# Patient Record
Sex: FEMALE
[De-identification: names, ages, dates, MRNs, and addresses within clinical notes are randomized per-mention and may not be internally consistent; named-entity substitution may affect disease eponyms.]

---

## 2020-02-24 ENCOUNTER — HOSPITAL ENCOUNTER (OUTPATIENT)
Dept: HOSPITAL 56 - MW.SDS | Age: 30
Setting detail: OBSERVATION
LOS: 1 days | Discharge: HOME | End: 2020-02-25
Attending: OBSTETRICS & GYNECOLOGY | Admitting: OBSTETRICS & GYNECOLOGY
Payer: COMMERCIAL

## 2020-02-24 DIAGNOSIS — O34.32: Primary | ICD-10-CM

## 2020-02-24 DIAGNOSIS — Z3A.22: ICD-10-CM

## 2020-02-24 DIAGNOSIS — E66.9: ICD-10-CM

## 2020-02-24 DIAGNOSIS — Z79.899: ICD-10-CM

## 2020-02-24 DIAGNOSIS — O99.212: ICD-10-CM

## 2020-02-24 PROCEDURE — 59320 REVISION OF CERVIX: CPT

## 2020-02-24 PROCEDURE — 85027 COMPLETE CBC AUTOMATED: CPT

## 2020-02-24 PROCEDURE — 36415 COLL VENOUS BLD VENIPUNCTURE: CPT

## 2020-02-24 PROCEDURE — 86900 BLOOD TYPING SEROLOGIC ABO: CPT

## 2020-02-24 PROCEDURE — 86592 SYPHILIS TEST NON-TREP QUAL: CPT

## 2020-02-24 PROCEDURE — 86850 RBC ANTIBODY SCREEN: CPT

## 2020-02-24 PROCEDURE — 86901 BLOOD TYPING SEROLOGIC RH(D): CPT

## 2020-02-24 NOTE — OR
SURGEON:

Fran Clemons MD

 

DATE OF PROCEDURE:

 

PREOPERATIVE DIAGNOSIS:

Intrauterine pregnancy at 22-23 weeks with cervical incompetence.

 

POSTOPERATIVE DIAGNOSIS:

Intrauterine pregnancy at 22-23 weeks with cervical incompetence.

 

OPERATION PERFORMED:

Emergency Gomez cerclage.

 

PRIMARY SURGEON:

Fran Clemons MD.

 

ASSISTANT:

OR tech.

 

ANESTHESIA:

Spinal, Mich Shannon.

 

ESTIMATED BLOOD LOSS:

Minimum.

 

COMPLICATION:

None.

 

INDICATIONS FOR SURGERY:

This patient is primigravida who has been followed in our office.  She came here

today for her 22 weeks' visit, which was unremarkable.  She had really no risk

factor other than the fact that she had multiple large fibroids in her uterus

prior to her pregnancy.  The patient went today for her anatomy ultrasound, and

she was found to have shortening of the cervix.  Her cervical length was less

than 1 cm and there was channeling of fluid into the cervical canal.  I reviewed

the ultrasound and concurred with the fact that the patient does have cervical

incompetence.  I did a speculum exam, and I could see the membrane was through

the cervical opening, so we decided to do an emergency cerclage.  I explained

that to the patient and her , and we proceeded with the cerclage.

 

PROCEDURE IN DETAIL:

The patient was brought to the OR, properly identified.  After adequate level of

spinal anesthesia, the patient was placed in Trendelenburg position, and after

waiting for 10 minutes for the amniotic fluid to recede from the cervical canal,

we proceeded to prep and drape the patient as usual.  Meng catheter was used to

empty the bladder and weighted speculum was placed in vagina.  Using Mersilene

band, Gomez cerclage was performed in a circular fashion.  Sutured around the

cervix and tied with due amount of tension, did not strangle the cervix.  Once

was that accomplished, the procedure ended.  The instrument and sponge count was

correct.  The patient tolerated the procedure well, went to recovery room in

stable general condition.

 

 

LILLIAN / JYOTI

DD:  02/24/2020 17:02:46

DT:  02/24/2020 17:39:15

Job #:  [id number]

## 2020-02-24 NOTE — PCM.PREANE
Preanesthetic Assessment





- Anesthesia/Transfusion/Family Hx


Anesthesia History: No Prior Anesthesia


Family History of Anesthesia Reaction: No


Transfusion History: No Prior Transfusion(s)





- Physical Assessment


NPO Status Date: 02/24/20


NPO Status Time: 08:00


Height: 1.63 m


Weight: 81.647 kg


ASA Class: 1E





- Allergies


Allergies/Adverse Reactions: 


 Allergies











Allergy/AdvReac Type Severity Reaction Status Date / Time


 


No Known Allergies Allergy   Verified 02/24/20 16:01














- Acknowledgements


Anesthesia Type Planned: Spinal


Pt an Appropriate Candidate for the Planned Anesthesia: Yes


Alternatives and Risks of Anesthesia Discussed w Pt/Guardian: Yes


Pt/Guardian Understands and Agrees with Anesthesia Plan: Yes





PreAnesthesia Questionnaire


OB/GYN History: Reports: Pregnancy


Endocrine/Metabolic History: Reports: Obesity/BMI 30+





- Past Surgical History


Head Surgeries/Procedures: Reports: None





- SUBSTANCE USE


Smoking Status *Q: Never Smoker


Recreational Drug Use History: No





- HOME MEDS


Home Medications: 


 Home Meds





Ondansetron [Zofran] 4 mg PO ASDIRECTED PRN 02/24/20 [History]


Promethazine [Phenergan] 25 mg PO ASDIRECTED PRN 02/24/20 [History]

## 2020-02-24 NOTE — PCM.POSTAN
POST ANESTHESIA ASSESSMENT





- MENTAL STATUS


Mental Status: Alert





- VITAL SIGNS


Vital Signs: 


 Last Vital Signs











Temp  36.8 C   02/24/20 16:59


 


Pulse  64   02/24/20 17:09


 


Resp  14   02/24/20 17:09


 


BP  122/73   02/24/20 17:09


 


Pulse Ox  100   02/24/20 17:09














- RESPIRATORY


Respiratory Status: Respiratory Rate WNL





- CARDIOVASCULAR


CV Status: Pulse Rate WNL





- GASTROINTESTINAL


GI Status: No Symptoms





- POST OP HYDRATION


Hydration Status: Adequate & Stable

## 2020-02-25 NOTE — PCM.SURGPN
- General Info


Date of Service: 02/25/20


POD#: 1


Functional Status: Reports: Pain Controlled





- Review of Systems


General: Reports: No Symptoms


HEENT: Reports: No Symptoms


Pulmonary: Reports: No Symptoms


Cardiovascular: Reports: No Symptoms


Gastrointestinal: Reports: No Symptoms


Genitourinary: Reports: No Symptoms


Musculoskeletal: Reports: No Symptoms


Skin: Reports: No Symptoms


Neurological: Reports: No Symptoms


Psychiatric: Reports: No Symptoms





- Patient Data


Vitals - Most Recent: 


 Last Vital Signs











Temp  36.3 C   02/25/20 08:00


 


Pulse  76   02/25/20 08:00


 


Resp  14   02/25/20 08:00


 


BP  123/78   02/25/20 08:00


 


Pulse Ox  98   02/25/20 08:00











Weight - Most Recent: 81.647 kg


I&O - Last 24 Hours: 


 Intake & Output











 02/24/20 02/25/20 02/25/20





 22:59 06:59 14:59


 


Intake Total 700  


 


Output Total  375 


 


Balance 700 -375 











Lab Results Last 24 Hrs: 


 Laboratory Results - last 24 hr











  02/24/20 02/24/20 Range/Units





  20:26 20:26 


 


WBC  9.39   (4.0-11.0)  K/uL


 


RBC  2.95 L   (4.30-5.90)  M/uL


 


Hgb  8.4 L   (12.0-16.0)  g/dL


 


Hct  26.8 L   (36.0-46.0)  %


 


MCV  90.8   (80.0-98.0)  fL


 


MCH  28.5   (27.0-32.0)  pg


 


MCHC  31.3   (31.0-37.0)  g/dL


 


RDW Std Deviation  44.1   (28.0-62.0)  fl


 


RDW Coeff of Bryan  13   (11.0-15.0)  %


 


Plt Count  299   (150-400)  K/uL


 


MPV  10.00   (7.40-12.00)  fL


 


Nucleated RBC %  0.0   /100WBC


 


Nucleated RBCs #  0   K/uL


 


Blood Type   A POSITIVE  


 


Antibody Screen   NEGATIVE  











Med Orders - Current: 


 Current Medications





Butorphanol Tartrate (Stadol)  1 mg IVPUSH Q1H PRN


   PRN Reason: Pain


Carboprost Tromethamine (Hemabate Ds)  250 mcg IM ASDIRECTED PRN


   PRN Reason: Post Partum Hemorrhage


Lactated Ringer's (Ringers, Lactated)  1,000 mls @ 150 mls/hr IV ASDIRECTED MAXIMO


   Last Admin: 02/24/20 16:00 Dose:  150 mls/hr


Tranexamic Acid 1,000 mg/ (Sodium Chloride)  110 mls @ 660 mls/hr IV ONETIME PRN


   PRN Reason: Bleeding


Lidocaine HCl (Xylocaine 1%)  50 ml INJECT ONETIME PRN


   PRN Reason: Laceration repair


Methylergonovine Maleate (Methergine)  0.2 mg IM ASDIRECTED PRN


   PRN Reason: Post Partum Hemorrhage


Misoprostol (Cytotec)  200 mcg PO ONETIME PRN


   PRN Reason: Post Partum Hemorrhage


Nalbuphine HCl (Nubain)  10 mg IVPUSH Q1H PRN


   PRN Reason: Pain (severe 7-10)


Oxycodone/Acetaminophen (Percocet 325-5 Mg)  1 tab PO Q4H PRN


   PRN Reason: Pain (moderate 4-6)


   Last Admin: 02/24/20 22:14 Dose:  1 tab


Oxycodone/Acetaminophen (Percocet 325-5 Mg)  2 tab PO Q4H PRN


   PRN Reason: Pain (severe 7-10)


Sodium Chloride (Saline Flush)  10 ml FLUSH ASDIRECTED PRN


   PRN Reason: Keep Vein Open


Sodium Chloride (Saline Flush)  2.5 ml FLUSH ASDIRECTED PRN


   PRN Reason: Keep Vein Open


Sodium Chloride (Normal Saline)  10 ml IV ASDIRECTED PRN


   PRN Reason: IV Use


Sterile Water (Sterile Water For Irrigation)  1,000 ml IRR ASDIRECTED PRN


   PRN Reason: delivery





Discontinued Medications





Betamethasone Acet/Betameth SodPhos (Celestone Soluspan 6 Mg/Ml)  12 mg IM 

ONETIME ONE


   Stop: 02/25/20 08:06


Cefazolin Sodium (Ancef) Confirm Administered Dose 1 gm .ROUTE .STK-MED ONE


   Stop: 02/24/20 16:12


Sodium Chloride (Normal Saline) Confirm Administered Dose 20 mls @ as directed 

.ROUTE .STK-MED ONE


   Stop: 02/24/20 16:12











- Exam


Wound/Incisions: Healing Well


General: Alert, Oriented


HEENT: Pupils Equal


Neck: Supple


Lungs: Clear to Auscultation, Normal Respiratory Effort


Cardiovascular: Regular Rate, Regular Rhythm


GI/Abdominal Exam: Normal Bowel Sounds, Soft, Non-Tender, No Organomegaly, No 

Distention, No Abnormal Bruit, No Mass, Pelvis Stable


Extremities: Normal Inspection, Normal Range of Motion, Non-Tender, No Pedal 

Edema, Normal Capillary Refill


Skin: Warm, Dry, Intact


Neurological: No New Focal Deficit


Psy/Mental Status: Alert, Normal Affect, Normal Mood





Sepsis Event Note





- Evaluation


Sepsis Screening Result: No Definite Risk





- Focused Exam


Vital Signs: 


 Vital Signs











  Temp Pulse Resp BP BP Pulse Ox


 


 02/25/20 08:00  36.3 C  76  14  123/78   98


 


 02/25/20 04:00  36.3 C  65  18   136/75  100


 


 02/25/20 00:20  36.6 C  75  16   124/62  97











Date Exam was Performed: 02/25/20


Time Exam was Performed: 10:28





- Problem List Review


Problem List Initiated/Reviewed/Updated: Yes





- My Orders


Last 24 Hours: 


 Active Orders 24 hr











 Category Date Time Status


 


 Patient Status [ADT] Routine ADT  02/24/20 16:55 Active


 


 Fetal Heart Tones [RC] ASDIRECTED Care  02/24/20 16:52 Active


 


 May Shower [RC] ASDIRECTED Care  02/24/20 16:55 Active


 


 Notify Provider [RC] PRN Care  02/24/20 16:55 Active


 


 Ready for Discharge [RC] PER UNIT ROUTINE Care  02/25/20 08:21 Active


 


 Up ad Aura [RC] ASDIRECTED Care  02/24/20 16:55 Active


 


 Vital Signs [RC] PER UNIT ROUTINE Care  02/24/20 16:55 Active


 


 Regular Diet [DIET] Diet  02/24/20 Dinner Active


 


 RPR (SYPHILIS SERO) W/ RFLX [REF] Routine Lab  02/24/20 20:26 Received


 


 Acetaminophen/oxyCODONE [Percocet 325-5 MG] Med  02/24/20 21:56 Active





 1 tab PO Q4H PRN   


 


 Acetaminophen/oxyCODONE [Percocet 325-5 MG] Med  02/24/20 21:59 Active





 2 tab PO Q4H PRN   


 


 Butorphanol [Stadol] Med  02/24/20 16:52 Active





 1 mg IVPUSH Q1H PRN   


 


 Carboprost Tromethamine [Hemabate DS] Med  02/24/20 16:52 Active





 250 mcg IM ASDIRECTED PRN   


 


 Lactated Ringers [Ringers, Lactated] 1,000 ml Med  02/24/20 17:00 Active





 IV ASDIRECTED   


 


 Lidocaine 1% [Xylocaine 1%] Med  02/24/20 16:52 Active





 50 ml INJECT ONETIME PRN   


 


 Methylergonovine [Methergine] Med  02/24/20 16:52 Active





 0.2 mg IM ASDIRECTED PRN   


 


 Nalbuphine [Nubain] Med  02/24/20 16:52 Active





 10 mg IVPUSH Q1H PRN   


 


 Sodium Chloride 0.9% [Normal Saline] Med  02/24/20 16:52 Active





 10 ml IV ASDIRECTED PRN   


 


 Sodium Chloride 0.9% [Saline Flush] Med  02/24/20 16:52 Active





 10 ml FLUSH ASDIRECTED PRN   


 


 Sodium Chloride 0.9% [Saline Flush] Med  02/24/20 16:52 Active





 2.5 ml FLUSH ASDIRECTED PRN   


 


 Tranexamic Acid [Cyklokapron] 1,000 mg Med  02/24/20 16:52 Active





 Sodium Chloride 0.9% [Normal Saline] 100 ml   





 IV ONETIME   


 


 Water For Irrigation,Sterile [Sterile Water for Med  02/24/20 16:52 Active





 Irrigation]   





 1,000 ml IRR ASDIRECTED PRN   


 


 miSOPROStoL [Cytotec] Med  02/24/20 16:52 Active





 200 mcg PO ONETIME PRN   


 


 Peripheral IV Insertion Adult [OM.PC] Routine Oth  02/24/20 16:55 Ordered


 


 Resuscitation Status Routine Resus Stat  02/24/20 16:52 Ordered








 Medication Orders





Butorphanol Tartrate (Stadol)  1 mg IVPUSH Q1H PRN


   PRN Reason: Pain


Carboprost Tromethamine (Hemabate Ds)  250 mcg IM ASDIRECTED PRN


   PRN Reason: Post Partum Hemorrhage


Lactated Ringer's (Ringers, Lactated)  1,000 mls @ 150 mls/hr IV ASDIRECTED MAXIMO


   Last Admin: 02/24/20 16:00  Dose: 150 mls/hr


Tranexamic Acid 1,000 mg/ (Sodium Chloride)  110 mls @ 660 mls/hr IV ONETIME PRN


   PRN Reason: Bleeding


Lidocaine HCl (Xylocaine 1%)  50 ml INJECT ONETIME PRN


   PRN Reason: Laceration repair


Methylergonovine Maleate (Methergine)  0.2 mg IM ASDIRECTED PRN


   PRN Reason: Post Partum Hemorrhage


Misoprostol (Cytotec)  200 mcg PO ONETIME PRN


   PRN Reason: Post Partum Hemorrhage


Nalbuphine HCl (Nubain)  10 mg IVPUSH Q1H PRN


   PRN Reason: Pain (severe 7-10)


Oxycodone/Acetaminophen (Percocet 325-5 Mg)  1 tab PO Q4H PRN


   PRN Reason: Pain (moderate 4-6)


   Last Admin: 02/24/20 22:14  Dose: 1 tab


Oxycodone/Acetaminophen (Percocet 325-5 Mg)  2 tab PO Q4H PRN


   PRN Reason: Pain (severe 7-10)


Sodium Chloride (Saline Flush)  10 ml FLUSH ASDIRECTED PRN


   PRN Reason: Keep Vein Open


Sodium Chloride (Saline Flush)  2.5 ml FLUSH ASDIRECTED PRN


   PRN Reason: Keep Vein Open


Sodium Chloride (Normal Saline)  10 ml IV ASDIRECTED PRN


   PRN Reason: IV Use


Sterile Water (Sterile Water For Irrigation)  1,000 ml IRR ASDIRECTED PRN


   PRN Reason: delivery











- Assessment


Assessment           (Free Text/Narrative):: 





Sports emergency Gomez's cerclage the patient have no bleeding and no 

contraction and no evidence of rupture membrane. We will give her her first 

dose of the steroid today and we'll send her home to be followed in the office 

next Friday





- Plan


Plan                        (Free Text/Narrative):: 





Bedrest to be followed in the office next Friday

## 2020-02-25 NOTE — [id number]
Post Anesthesia Note





- EVALUATION WITHIN 48HRS OF ANESTHETIC


Vital Signs in Normal Range: Yes


Patient Participated in Evaluation: Yes


Respiratory Function Stable: Yes


Airway Patent: Yes


Cardiovascular Function Stable: Yes


Hydration Status Stable: Yes


Pain Control Satisfactory: Yes


Nausea and Vomiting Control Satisfactory: Yes


Mental Status Recovered: Yes


Vital Signs: 


 Last Vital Signs











Temp  36.3 C   02/25/20 04:00


 


Pulse  65   02/25/20 04:00


 


Resp  18   02/25/20 04:00


 


BP  136/75   02/25/20 04:00


 


Pulse Ox  100   02/25/20 04:00














- COMMENTS/OBSERVATIONS


Free Text/Narrative:: 





Dressed and ready to go home.

## 2020-02-25 NOTE — PCM.OPNOTE
- General Post-Op/Procedure Note


Date of Surgery/Procedure: 02/24/20


Operative Procedure(s): Gomez Cerclage


Pre Op Diagnosis: [id number], Cervical incompetance.


Post-Op Diagnosis: Same


Anesthesia Technique: Spinal


Primary Surgeon: Fran Clemons


Complications: None


Condition: Good


Free Text/Narrative:: 


 Intake & Output











 02/24/20 02/25/20 02/25/20





 22:59 06:59 14:59


 


Intake Total 700  


 


Output Total  375 


 


Balance 700 -375

## 2020-02-25 NOTE — PCM.DCSUM1
**Discharge Summary





- Hospital Course


Diagnosis: Stroke: No





- Discharge Data


Discharge Date: 02/25/20


Discharge Disposition: Home, Self-Care 01


Condition: Good





- Referral to Home Health


Primary Care Physician: 


Fran Clemons MD








- Patient Summary/Data


Operative Procedure(s) Performed: Gomez Cerclage





- Patient Instructions


Diet: Usual Diet as Tolerated


Activity: As Tolerated


Driving: Do Not Drive


Showering/Bathing: May Shower





- Discharge Plan


Home Medications: 


 Home Meds





Ondansetron [Zofran] 4 mg PO ASDIRECTED PRN 02/24/20 [History]


Promethazine [Phenergan] 25 mg PO ASDIRECTED PRN 02/24/20 [History]








Patient Handouts:  Cervical Cerclage, Betamethasone injection, Cervical Cerclage

, Care After


Referrals: 


[facility] [Outside]


rFan Clemons MD [Primary Care Provider] - 02/28/20 1:15 pm





- Discharge Summary/Plan Comment


DC Time >30 min.: Yes





- General Info


Date of Service: 02/25/20


Functional Status: Reports: Pain Controlled





- Review of Systems


General: Reports: No Symptoms


HEENT: Reports: No Symptoms


Pulmonary: Reports: No Symptoms


Cardiovascular: Reports: No Symptoms


Gastrointestinal: Reports: No Symptoms


Genitourinary: Reports: No Symptoms


Musculoskeletal: Reports: No Symptoms


Skin: Reports: No Symptoms


Neurological: Reports: No Symptoms


Psychiatric: Reports: No Symptoms





- Patient Data


Vitals - Most Recent: 


 Last Vital Signs











Temp  36.3 C   02/25/20 08:00


 


Pulse  76   02/25/20 08:00


 


Resp  14   02/25/20 08:00


 


BP  123/78   02/25/20 08:00


 


Pulse Ox  98   02/25/20 08:00











Weight - Most Recent: 81.647 kg


I&O - Last 24 hours: 


 Intake & Output











 02/24/20 02/25/20 02/25/20





 22:59 06:59 14:59


 


Intake Total 700  


 


Output Total  375 


 


Balance 700 -375 











Lab Results - Last 24 hrs: 


 Laboratory Results - last 24 hr











  02/24/20 02/24/20 Range/Units





  20:26 20:26 


 


WBC  9.39   (4.0-11.0)  K/uL


 


RBC  2.95 L   (4.30-5.90)  M/uL


 


Hgb  8.4 L   (12.0-16.0)  g/dL


 


Hct  26.8 L   (36.0-46.0)  %


 


MCV  90.8   (80.0-98.0)  fL


 


MCH  28.5   (27.0-32.0)  pg


 


MCHC  31.3   (31.0-37.0)  g/dL


 


RDW Std Deviation  44.1   (28.0-62.0)  fl


 


RDW Coeff of Bryan  13   (11.0-15.0)  %


 


Plt Count  299   (150-400)  K/uL


 


MPV  10.00   (7.40-12.00)  fL


 


Nucleated RBC %  0.0   /100WBC


 


Nucleated RBCs #  0   K/uL


 


Blood Type   A POSITIVE  


 


Antibody Screen   NEGATIVE  











Med Orders - Current: 


 Current Medications





Butorphanol Tartrate (Stadol)  1 mg IVPUSH Q1H PRN


   PRN Reason: Pain


Carboprost Tromethamine (Hemabate Ds)  250 mcg IM ASDIRECTED PRN


   PRN Reason: Post Partum Hemorrhage


Lactated Ringer's (Ringers, Lactated)  1,000 mls @ 150 mls/hr IV ASDIRECTED MAXIMO


   Last Admin: 02/24/20 16:00 Dose:  150 mls/hr


Tranexamic Acid 1,000 mg/ (Sodium Chloride)  110 mls @ 660 mls/hr IV ONETIME PRN


   PRN Reason: Bleeding


Lidocaine HCl (Xylocaine 1%)  50 ml INJECT ONETIME PRN


   PRN Reason: Laceration repair


Methylergonovine Maleate (Methergine)  0.2 mg IM ASDIRECTED PRN


   PRN Reason: Post Partum Hemorrhage


Misoprostol (Cytotec)  200 mcg PO ONETIME PRN


   PRN Reason: Post Partum Hemorrhage


Nalbuphine HCl (Nubain)  10 mg IVPUSH Q1H PRN


   PRN Reason: Pain (severe 7-10)


Oxycodone/Acetaminophen (Percocet 325-5 Mg)  1 tab PO Q4H PRN


   PRN Reason: Pain (moderate 4-6)


   Last Admin: 02/24/20 22:14 Dose:  1 tab


Oxycodone/Acetaminophen (Percocet 325-5 Mg)  2 tab PO Q4H PRN


   PRN Reason: Pain (severe 7-10)


Sodium Chloride (Saline Flush)  10 ml FLUSH ASDIRECTED PRN


   PRN Reason: Keep Vein Open


Sodium Chloride (Saline Flush)  2.5 ml FLUSH ASDIRECTED PRN


   PRN Reason: Keep Vein Open


Sodium Chloride (Normal Saline)  10 ml IV ASDIRECTED PRN


   PRN Reason: IV Use


Sterile Water (Sterile Water For Irrigation)  1,000 ml IRR ASDIRECTED PRN


   PRN Reason: delivery





Discontinued Medications





Betamethasone Acet/Betameth SodPhos (Celestone Soluspan 6 Mg/Ml)  12 mg IM 

ONETIME ONE


   Stop: 02/25/20 08:06


Cefazolin Sodium (Ancef) Confirm Administered Dose 1 gm .ROUTE .STK-MED ONE


   Stop: 02/24/20 16:12


Sodium Chloride (Normal Saline) Confirm Administered Dose 20 mls @ as directed 

.ROUTE .STK-MED ONE


   Stop: 02/24/20 16:12











- Exam


General: Reports: Alert, Oriented


HEENT: Reports: Pupils Equal, Pupils Reactive, EOMI, Mucous Membr. Moist/Pink


Neck: Reports: Supple


Lungs: Reports: Clear to Auscultation, Normal Respiratory Effort


Cardiovascular: Reports: Regular Rate, Regular Rhythm


GI/Abdominal Exam: Normal Bowel Sounds, Soft, Non-Tender, No Organomegaly, No 

Distention, No Abnormal Bruit, No Mass, Pelvis Stable


 (Female) Exam: Normal External Exam, Normal Speculum Exam, Normal Bimanual 

Exam


Rectal (Female) Exam: Normal Exam, Normal Rectal Tone


Back Exam: Reports: Normal Inspection, Full Range of Motion


Extremities: Normal Inspection, Normal Range of Motion, Non-Tender, No Pedal 

Edema, Normal Capillary Refill


Skin: Reports: Warm, Dry, Intact


Wound/Incisions: Reports: Healing Well


Neurological: Reports: No New Focal Deficit


Psy/Mental Status: Reports: Alert, Normal Affect, Normal Mood

## 2020-04-01 NOTE — US
Limited obstetrical ultrasound: Multiple real-time images were 

obtained transvaginally of the cervix.

 

Comparison: Previous obstetrical ultrasound of 02/24/20.

 

Fetal presentation: Breech

Heart rate: 168 BPM

 

Cervix: Incompetent cervix with widening of the internal cervical os 

with slight widening of the external cervical os.

 

Impression:

1.  Incompetent cervix which is worsening from previous study.  

Current study shows slight widening of the external cervical os.

 

Diagnostic code #5

 

Study was dictated in MDT

## 2020-04-01 NOTE — PCM.POSTAN
POST ANESTHESIA ASSESSMENT





- MENTAL STATUS


Mental Status: Alert, Oriented





- VITAL SIGNS


Vital Signs: 


 Last Vital Signs











Temp  36 C L  04/01/20 14:38


 


Pulse  124 H  04/01/20 15:30


 


Resp  22 H  04/01/20 15:30


 


BP  113/65   04/01/20 15:30


 


Pulse Ox  100   04/01/20 15:30














- RESPIRATORY


Respiratory Status: Respiratory Rate WNL, Airway Patent, O2 Saturation Stable





- CARDIOVASCULAR


CV Status: Pulse Rate WNL, Blood Pressure Stable





- GASTROINTESTINAL


GI Status: No Symptoms





- PAIN


Pain Score: 0





- POST OP HYDRATION


Hydration Status: Adequate & Stable





- OBSERVATIONS


Free Text/Narrative:: 





No anesthesia problems

## 2020-04-01 NOTE — OR
SURGEON:

Fran Clemons MD

 

DATE OF PROCEDURE:

 

PREOPERATIVE DIAGNOSES:

Intrauterine pregnancy at 27 plus 4 weeks in active labor.  Breech presentation

with the protruding amniotic fluid bag.

 

POSTOPERATIVE DIAGNOSES:

Intrauterine pregnancy at 27 plus 4 weeks in active labor.  Breech presentation

with the protruding amniotic fluid bag.

 

OPERATION PERFORMED:

Primary low transverse  section with removal of Gomez cerclage.

 

PRIMARY SURGEON:

Fran Clemons MD

 

ASSISTANT:

Maribel Hui, certified nurse midwife.

 

ANESTHESIA:

Spinal, Kylah Juarez and Dr. Olivares

 

ESTIMATED BLOOD LOSS:

650 mL.

 

COMPLICATIONS:

None.

 

INDICATIONS FOR SURGERY:

This patient is 29.  She is nulliparous.  Originally, her problem started at 23

weeks when she had funneling of her cervix with a protruding amniotic fluid.  At

that time, I did a oGmez cerclage and placed the patient on strict bedrest,

and we started her on progesterone weekly.  We were able to hold her until 27

plus 4 now.  Today, she presented into Labor and Delivery in active labor with

lower abdominal pain and cramping and regular contractions.  Speculum

examination showed that the cervix was almost 80% to 90% effaced and it was

dilated 3 cm and amniotic fluid membrane was seen and bulging through the os.

She is breech presentation and the cervix was holding by the Gomez cerclage.

Because of the patient's unstable lie and unstable presentation, the decision

was made to do a primary low transverse  section.  We will contact the

prenatal team and have them come to [place] to resuscitate the baby, and we

will do her  section as soon as the team has arrived.

 

PROCEDURE IN DETAIL:

The patient was brought to the OR, properly identified, and after adequate level

of spinal anesthesia, the patient was prepped and draped in sterile fashion as

usual.  A low transverse Pfannenstiel skin incision done.  Trenton fascia and

rectus fascia were opened in direction of the incision.  The 2 recti muscles

were , peritoneal cavity was entered, and low transverse uterine

incision was done.  The fetus was in a breech presentation, delivered without

any problem, handed to the resuscitating team.  Apgar score and weight are not

available at this time.  The placenta delivered spontaneous, complete, and

intact and sent for histopathology.  Repair of the lower uterine segment was

done with 2-0 Vicryl continuous interlocking in 2 layers.  Reperitonealization

done with 3-0 Vicryl continuous and then the peritoneal cavity evacuated

completely from all blood and blood clot.  Then, the peritoneal cavity closed

with 3-0 Vicryl continuous.  The rectus fascia closed with #1 PDS continuous.

Trenton fascia with 3-0 Vicryl continuous.  The skin closed with 3-0 on a Julius

needle in a subcuticular fashion and Dermabond.  After closing the skin, the

patient was placed in lithotomy position and speculum was placed in the vagina

and the cerclage was identified and cut and removed without any problem.

Instrument and sponge count was correct.  The patient tolerated the procedure

well, went to recovery room in stable general condition.

 

 

LILLIAN / JYOTI

DD:  2020 15:17:56

DT:  2020 16:39:16

Job #:  [id number]

[email or web address]

## 2020-04-01 NOTE — PCM.OPNOTE
- General Post-Op/Procedure Note


Date of Surgery/Procedure: 04/01/20


Operative Procedure(s): Primary C/section. removal of [place] Cereclage.


Pre Op Diagnosis: IUP 27+4 wks breech presentation in labor


Post-Op Diagnosis: Same


Anesthesia Technique: Spinal


Primary Surgeon: Fran Clemons


Assistant: Maribel Hui


EBL in mLs: 650


Complications: None


Condition: Good

## 2020-04-01 NOTE — PCM.PREANE
Preanesthetic Assessment





- Anesthesia/Transfusion/Family Hx


Anesthesia History: Prior Anesthesia Without Reaction


Family History of Anesthesia Reaction: No


Transfusion History: No Prior Transfusion(s)


Intubation History: Unknown





- Review of Systems


General: No Symptoms


Pulmonary: No Symptoms


Cardiovascular: No Symptoms


Gastrointestinal: No Symptoms


Neurological: No Symptoms


Other: Reports: None





- Physical Assessment


NPO Status Date: 04/01/20


NPO Status Time: 11:00


Vital Signs: 





110/78 78 96% 


Height: 5 ft 4 in


Weight: 73.028 kg


ASA Class: 2E


Mental Status: Alert & Oriented x3


Dentition: Reports: Normal Dentition


ROM/Head Extension: Full


Lungs: Clear to Auscultation, Normal Respiratory Effort


Cardiovascular: Regular Rate, Regular Rhythm





- Lab


Values: 





 Laboratory Last Values











WBC  16.94 K/uL (4.0-11.0)  H  04/01/20  09:50    


 


RBC  3.74 M/uL (4.30-5.90)  L  04/01/20  09:50    


 


Hgb  11.0 g/dL (12.0-16.0)  L  04/01/20  09:50    


 


Hct  34.8 % (36.0-46.0)  L  04/01/20  09:50    


 


MCV  93.0 fL (80.0-98.0)   04/01/20  09:50    


 


MCH  29.4 pg (27.0-32.0)   04/01/20  09:50    


 


MCHC  31.6 g/dL (31.0-37.0)   04/01/20  09:50    


 


RDW Std Deviation  55.5 fl (28.0-62.0)   04/01/20  09:50    


 


RDW Coeff of Bryan  16 % (11.0-15.0)  H  04/01/20  09:50    


 


Plt Count  264 K/uL (150-400)   04/01/20  09:50    


 


MPV  10.40 fL (7.40-12.00)   04/01/20  09:50    


 


Nucleated RBC %  0.0 /100WBC  04/01/20  09:50    


 


Nucleated RBCs #  0 K/uL  04/01/20  09:50    


 


Urine Color  YELLOW   04/01/20  04:50    


 


Urine Appearance  CLEAR   04/01/20  04:50    


 


Urine pH  6.0  (5.0-8.0)   04/01/20  04:50    


 


Ur Specific Gravity  >= 1.030  (1.001-1.035)   04/01/20  04:50    


 


Urine Protein  NEGATIVE mg/dL (NEGATIVE)   04/01/20  04:50    


 


Urine Glucose (UA)  NEGATIVE mg/dL (NEGATIVE)   04/01/20  04:50    


 


Urine Ketones  15 mg/dL (NEGATIVE)  H  04/01/20  04:50    


 


Urine Occult Blood  MODERATE  (NEGATIVE)  H  04/01/20  04:50    


 


Urine Nitrite  NEGATIVE  (NEGATIVE)   04/01/20  04:50    


 


Urine Bilirubin  NEGATIVE  (NEGATIVE)   04/01/20  04:50    


 


Urine Urobilinogen  0.2 EU/dL (<2.0)   04/01/20  04:50    


 


Ur Leukocyte Esterase  SMALL  (NEGATIVE)  H  04/01/20  04:50    


 


Blood Type  A POSITIVE   04/01/20  09:50    


 


Antibody Screen  NEGATIVE   04/01/20  09:50    














- Allergies


Allergies/Adverse Reactions: 


 Allergies











Allergy/AdvReac Type Severity Reaction Status Date / Time


 


No Known Allergies Allergy   Verified 02/24/20 16:01














- Blood


Blood Available: Yes





- Anesthesia Plan


Free Text/Narrative:: 





Spinal anesthesia


Pre-Op Medication Ordered: None





- Acknowledgements


Anesthesia Type Planned: Spinal


Pt an Appropriate Candidate for the Planned Anesthesia: Yes


Alternatives and Risks of Anesthesia Discussed w Pt/Guardian: Yes


Pt/Guardian Understands and Agrees with Anesthesia Plan: Yes


Additional Comments: 





Pt verbalized understanding





PreAnesthesia Questionnaire


HEENT History: Reports: None


Cardiovascular History: Reports: None


Respiratory History: Reports: None


Gastrointestinal History: Reports: None


Genitourinary History: Reports: None


OB/GYN History: Reports: None, Pregnancy


Musculoskeletal History: Reports: None


Neurological History: Reports: None


Psychiatric History: Reports: None


Endocrine/Metabolic History: Reports: None


Hematologic History: Reports: None


Oncologic (Cancer) History: Reports: None


Dermatologic History: Reports: None





- Infectious Disease History


Infectious Disease History: Reports: None





- Past Surgical History


Head Surgeries/Procedures: Reports: None


HEENT Surgical History: Reports: None


Cardiovascular Surgical History: Reports: None


Respiratory Surgical History: Reports: None


GI Surgical History: Reports: None


Female  Surgical History: Reports: None


Endocrine Surgical History: Reports: None


Neurological Surgical History: Reports: None


Musculoskeletal Surgical History: Reports: None


Oncologic Surgical History: Reports: None


Dermatological Surgical History: Reports: None





- HOME MEDS


Home Medications: 


 Home Meds





Ondansetron [Zofran] 4 mg PO ASDIRECTED PRN 02/24/20 [History]


Promethazine [Phenergan] 25 mg PO ASDIRECTED PRN 02/24/20 [History]


Ferrous Sulfate [Iron] 325 mg PO DAILY 04/01/20 [History]











- CURRENT (IN HOUSE) MEDS


Current Meds: 





 Current Medications





Butorphanol Tartrate (Stadol)  1 mg IVPUSH Q1H PRN


   PRN Reason: Pain


Carboprost Tromethamine (Hemabate Ds)  250 mcg IM ASDIRECTED PRN


   PRN Reason: Post Partum Hemorrhage


Lactated Ringer's (Ringers, Lactated)  1,000 mls @ 999 mls/hr IV .BOLUS [facility]


   Last Admin: 04/01/20 09:40 Dose:  999 mls/hr


Tranexamic Acid 1,000 mg/ (Sodium Chloride)  110 mls @ 660 mls/hr IV ONETIME PRN


   PRN Reason: Bleeding


Lactated Ringer's (Ringers, Lactated)  1,000 mls @ 150 mls/hr IV ASDIRECTED [facility]


   Last Admin: 04/01/20 10:34 Dose:  150 mls/hr


Oxytocin/Sodium Chloride (Oxytocin 30 Unit/500 Ml-Ns)  30 unit in 500 mls @ 999 

mls/hr IV TITRATE [facility]


Lactated Ringer's (Ringers, Lactated)  1,000 mls @ 500 mls/hr IV BOLUS [facility]


Oxytocin/Sodium Chloride (Oxytocin 30 Unit/500 Ml-Ns)  30 unit in 500 mls @ 250 

mls/hr IV TITRATE [facility]


Lidocaine HCl (Xylocaine 1%)  50 ml INJECT ONETIME PRN


   PRN Reason: Laceration repair


Methylergonovine Maleate (Methergine)  0.2 mg IM ASDIRECTED PRN


   PRN Reason: Post Partum Hemorrhage


Misoprostol (Cytotec)  200 mcg PO ONETIME PRN


   PRN Reason: Post Partum Hemorrhage


Nalbuphine HCl (Nubain)  10 mg IVPUSH Q1H PRN


   PRN Reason: Pain (severe 7-10)


Sodium Chloride (Saline Flush)  10 ml FLUSH ASDIRECTED PRN


   PRN Reason: Keep Vein Open


Sodium Chloride (Saline Flush)  2.5 ml FLUSH ASDIRECTED PRN


   PRN Reason: Keep Vein Open


Sodium Chloride (Normal Saline)  10 ml IV ASDIRECTED PRN


   PRN Reason: IV Use


Sodium Chloride (Saline Flush)  10 ml FLUSH ASDIRECTED PRN


   PRN Reason: Keep Vein Open


Sodium Chloride (Saline Flush)  2.5 ml FLUSH ASDIRECTED PRN


   PRN Reason: Keep Vein Open


Sodium Chloride (Normal Saline)  10 ml IV ASDIRECTED PRN


   PRN Reason: IV Use


Sodium Chloride (Saline Flush)  10 ml FLUSH ASDIRECTED PRN


   PRN Reason: Keep Vein Open


Sodium Chloride (Saline Flush)  2.5 ml FLUSH ASDIRECTED PRN


   PRN Reason: Keep Vein Open


Sodium Chloride (Normal Saline)  10 ml IV ASDIRECTED PRN


   PRN Reason: IV Use


Sterile Water (Sterile Water For Irrigation)  1,000 ml IRR ASDIRECTED PRN


   PRN Reason: delivery





Discontinued Medications





Betamethasone Acet/Betameth SodPhos (Celestone Soluspan 6 Mg/Ml)  12 mg IM 

ONETIME ONE


   Stop: 04/01/20 09:38


   Last Admin: 04/01/20 09:47 Dose:  12 mg


Citric Acid/Sodium Citrate (Bicitra Solution)  30 ml PO ONETIME ONE


   Stop: 04/01/20 09:39


Dexamethasone (Dexamethasone)  12 mg IVPUSH ONETIME ONE


   Stop: 04/01/20 09:31


Terbutaline Sulfate (Brethine)  0.25 mg SUBCUT ONETIME ONE


   Stop: 04/01/20 08:53


   Last Admin: 04/01/20 09:35 Dose:  0.25 mg


Terbutaline Sulfate (Brethine)  0.25 mg SUBCUT ONETIME ONE


   Stop: 04/01/20 11:00


   Last Admin: 04/01/20 11:01 Dose:  0.25 mg

## 2020-04-01 NOTE — PCM.LDHP
L&D History of Present Illness





- General


Date of Service: 20


Admit Problem/Dx: 


 Patient Status Order with Admit Dx/Problem





20 04:53


Patient Status [ADT] Routine 





20 09:38


Patient Status [ADT] Routine 


Patient Status [ADT] Routine 








 Admission Diagnosis/Problem











Admission Diagnosis/Problem    Pregnancy














Source of Information: Patient


History Limitations: Reports: No Limitations





- History of Present Illness


Improves with: Reports: None


Worsens with: Reports: None


Associated Symptoms: Reports: N





- Related Data


Allergies/Adverse Reactions: 


 Allergies











Allergy/AdvReac Type Severity Reaction Status Date / Time


 


No Known Allergies Allergy   Verified 20 16:01











Home Medications: 


 Home Meds





Ondansetron [Zofran] 4 mg PO ASDIRECTED PRN 20 [History]


Promethazine [Phenergan] 25 mg PO ASDIRECTED PRN 20 [History]


Ferrous Sulfate [Iron] 325 mg PO DAILY 20 [History]











Past Medical History


HEENT History: Reports: None


Cardiovascular History: Reports: None


Respiratory History: Reports: None


Gastrointestinal History: Reports: None


Genitourinary History: Reports: None


OB/GYN History: Reports: None, Pregnancy


Musculoskeletal History: Reports: None


Neurological History: Reports: None


Psychiatric History: Reports: None


Endocrine/Metabolic History: Reports: None


Hematologic History: Reports: None


Oncologic (Cancer) History: Reports: None


Dermatologic History: Reports: None





- Infectious Disease History


Infectious Disease History: Reports: None





- Past Surgical History


Head Surgeries/Procedures: Reports: None


HEENT Surgical History: Reports: None


Cardiovascular Surgical History: Reports: None


Respiratory Surgical History: Reports: None


GI Surgical History: Reports: None


Female  Surgical History: Reports: None


Endocrine Surgical History: Reports: None


Neurological Surgical History: Reports: None


Musculoskeletal Surgical History: Reports: None


Oncologic Surgical History: Reports: None


Dermatological Surgical History: Reports: None





Social & Family History





- Family History


Family Medical History: Noncontributory





- Tobacco Use


Smoking Status *Q: Never Smoker


Second Hand Smoke Exposure: No





- Caffeine Use


Caffeine Use: Reports: Soda





- Recreational Drug Use


Recreational Drug Use: No





H&P Review of Systems





- Review of Systems:


Review Of Systems: See Below


General: Reports: No Symptoms


HEENT: Reports: No Symptoms


Pulmonary: Reports: No Symptoms


Cardiovascular: Reports: No Symptoms


Gastrointestinal: Reports: No Symptoms


Genitourinary: Reports: No Symptoms


Musculoskeletal: Reports: No Symptoms


Skin: Reports: No Symptoms


Psychiatric: Reports: No Symptoms


Neurological: Reports: No Symptoms


Hematologic/Lymphatic: Reports: No Symptoms


Immunologic: Reports: No Symptoms





L&D Exam





- Exam


Exam: See Below





- Vital Signs


Vital Signs: 


 Last Vital Signs











Temp  36 C L  20 14:38


 


Pulse  120 H  20 14:55


 


Resp  22 H  20 14:55


 


BP  95/57 L  20 14:55


 


Pulse Ox  100   20 14:55











Weight: 73.028 kg





- OB Specific


Contraction Intensity: Moderate


Fetal Movement: Active


Fetal Heart Tones: Present


Birth Presentation: Breech





- Ocampo Score


Ocampo Score Cervix Position: Anterior


Ocampo Score Consistency: Soft


Ocampo Score Effacement: 51-70%


Ocampo Score Dilation: 1-2 cm


Ocampo Score Infant's Station: -3


Ocampo Score Total: 7





- Exam


General: Alert, Oriented


HEENT: PERRLA, Conjunctiva Clear, EACs Clear, EOMI, Hearing Intact, Mucosa 

Moist & Pink, Nares Patent, Normal Nasal Septum, Posterior Pharynx Clear, TMs 

Clear


Neck: Supple, Trachea Midline


Lungs: Clear to Auscultation, Normal Respiratory Effort


Cardiovascular: Regular Rate, Regular Rhythm


GI/Abdominal Exam: Normal Bowel Sounds, Soft, Non-Tender, No Organomegaly, No 

Distention, No Abnormal Bruit, No Mass, Pelvis Stable


Rectal Exam: Normal Exam, Normal Rectal Tone


Genitourinary: Normal external exam, Normal bimanual exam, Normal speculum exam


Back Exam: Normal Inspection, Full Range of Motion


Extremities: Normal Inspection, Normal Range of Motion, Non-Tender, No Pedal 

Edema, Normal Capillary Refill


Skin: Warm, Dry, Intact


Neurological: Cranial Nerves Intact, Reflexes Equal Bilateral


Psychiatric: Alert, Normal Affect, Normal Mood





- Patient Data


Lab Results Last 24 hrs: 


 Laboratory Results - last 24 hr











  20 Range/Units





  04:50 09:50 09:50 


 


WBC   16.94 H   (4.0-11.0)  K/uL


 


RBC   3.74 L   (4.30-5.90)  M/uL


 


Hgb   11.0 L   (12.0-16.0)  g/dL


 


Hct   34.8 L   (36.0-46.0)  %


 


MCV   93.0   (80.0-98.0)  fL


 


MCH   29.4   (27.0-32.0)  pg


 


MCHC   31.6   (31.0-37.0)  g/dL


 


RDW Std Deviation   55.5   (28.0-62.0)  fl


 


RDW Coeff of Bryan   16 H   (11.0-15.0)  %


 


Plt Count   264   (150-400)  K/uL


 


MPV   10.40   (7.40-12.00)  fL


 


Nucleated RBC %   0.0   /100WBC


 


Nucleated RBCs #   0   K/uL


 


Urine Color  YELLOW    


 


Urine Appearance  CLEAR    


 


Urine pH  6.0    (5.0-8.0)  


 


Ur Specific Gravity  >= 1.030    (1.001-1.035)  


 


Urine Protein  NEGATIVE    (NEGATIVE)  mg/dL


 


Urine Glucose (UA)  NEGATIVE    (NEGATIVE)  mg/dL


 


Urine Ketones  15 H    (NEGATIVE)  mg/dL


 


Urine Occult Blood  MODERATE H    (NEGATIVE)  


 


Urine Nitrite  NEGATIVE    (NEGATIVE)  


 


Urine Bilirubin  NEGATIVE    (NEGATIVE)  


 


Urine Urobilinogen  0.2    (<2.0)  EU/dL


 


Ur Leukocyte Esterase  SMALL H    (NEGATIVE)  


 


Blood Type    A POSITIVE  


 


Antibody Screen    NEGATIVE  











Result Diagrams: 


 20 09:50





Problem List Initiated/Reviewed/Updated: Yes


Orders Last 24hrs: 


 Active Orders 24 hr











 Category Date Time Status


 


 Patient Status [ADT] Routine ADT  20 09:38 Active


 


 Patient Status [ADT] Routine ADT  20 09:38 Active


 


 Bradycardia-Neuroaxis [employer] [RC] ROUTINE Care  20 14:33 Active


 


 Hypertension-Neuroaxis [employer] [RC] ROUTINE Care  20 14:33 Active


 


 Hypotension-Neuroaxis [employer] [RC] ROUTINE Care  20 14:33 Active


 


 May Shower [RC] ASDIRECTED Care  20 09:38 Active


 


 Notify Provider Vital Signs [RC] PRN Care  20 09:40 Active


 


 Oxygen Therapy [RC] PER UNIT ROUTINE Care  20 14:33 Active


 


 Up ad Aura [RC] ASDIRECTED Care  20 09:38 Active


 


 Vital Signs [RC] Q1H Care  20 14:33 Active


 


 RPR (SYPHILIS SERO) W/ RFLX [REF] Routine Lab  20 09:50 Received


 


 Acetaminophen/oxyCODONE [Percocet 325-5 MG] Med  20 14:33 Active





 2 tab PO Q6H PRN   


 


 Butorphanol [Stadol] Med  20 09:38 Active





 1 mg IVPUSH Q1H PRN   


 


 Carboprost Tromethamine [Hemabate DS] Med  20 09:38 Active





 250 mcg IM ASDIRECTED PRN   


 


 Lactated Ringers [Ringers, Lactated] 1,000 ml Med  20 09:00 Active





 IV .BOLUS   


 


 Lactated Ringers [Ringers, Lactated] 1,000 ml Med  20 09:45 Active





 IV ASDIRECTED   


 


 Lactated Ringers [Ringers, Lactated] 1,000 ml Med  20 09:45 Active





 IV BOLUS   


 


 Lidocaine 1% [Xylocaine 1%] Med  20 09:38 Active





 50 ml INJECT ONETIME PRN   


 


 Methylergonovine [Methergine] Med  20 09:38 Active





 0.2 mg IM ASDIRECTED PRN   


 


 Nalbuphine [Nubain] Med  20 09:38 Active





 10 mg IVPUSH Q1H PRN   


 


 Nalbuphine [Nubain] Med  20 14:33 Active





 5 mg IVPUSH ASDIRECTED PRN   


 


 Naloxone [Narcan] Med  20 14:33 Active





 0.1 mg IVPUSH ONETIME PRN   


 


 Ondansetron [Zofran] Med  20 14:33 Active





 4 mg IVPUSH Q6H PRN   


 


 Oxytocin/0.9 % Sodium Chloride [Oxytocin 30 Unit/500 ML Med  20 09:45 

Active





 -NS]   





 30 unit in 500 ml IV TITRATE   


 


 Oxytocin/0.9 % Sodium Chloride [Oxytocin 30 Unit/500 ML Med  20 09:45 

Active





 -NS]   





 30 unit in 500 ml IV TITRATE   


 


 Sodium Chloride 0.9% [Normal Saline] Med  20 08:49 Active





 10 ml IV ASDIRECTED PRN   


 


 Sodium Chloride 0.9% [Normal Saline] Med  20 09:38 Active





 10 ml IV ASDIRECTED PRN   


 


 Sodium Chloride 0.9% [Normal Saline] Med  20 09:38 Active





 10 ml IV ASDIRECTED PRN   


 


 Sodium Chloride 0.9% [Saline Flush] Med  20 08:49 Active





 10 ml FLUSH ASDIRECTED PRN   


 


 Sodium Chloride 0.9% [Saline Flush] Med  20 09:38 Active





 10 ml FLUSH ASDIRECTED PRN   


 


 Sodium Chloride 0.9% [Saline Flush] Med  20 09:38 Active





 10 ml FLUSH ASDIRECTED PRN   


 


 Sodium Chloride 0.9% [Saline Flush] Med  20 08:49 Active





 2.5 ml FLUSH ASDIRECTED PRN   


 


 Sodium Chloride 0.9% [Saline Flush] Med  20 09:38 Active





 2.5 ml FLUSH ASDIRECTED PRN   


 


 Sodium Chloride 0.9% [Saline Flush] Med  20 09:38 Active





 2.5 ml FLUSH ASDIRECTED PRN   


 


 Tranexamic Acid [Cyklokapron] 1,000 mg Med  20 09:38 Active





 Sodium Chloride 0.9% [Normal Saline] 100 ml   





 IV ONETIME   


 


 Water For Irrigation,Sterile [Sterile Water for Med  20 09:38 Active





 Irrigation]   





 1,000 ml IRR ASDIRECTED PRN   


 


 diphenhydrAMINE [Benadryl] Med  20 14:33 Active





 25 mg IVPUSH Q4H PRN   


 


 fentaNYL [Sublimaze] Med  20 14:33 Active





 50 mcg IVPUSH Q1H PRN   


 


 miSOPROStoL [Cytotec] Med  20 09:38 Active





 200 mcg PO ONETIME PRN   


 


 Fetal Scalp Electrode [WOMSER] Per Unit Routine Oth  20 09:38 Ordered


 


 Peripheral IV Insertion Adult [OM.PC] Routine Ot  20 08:49 Ordered


 


 Peripheral IV Insertion Adult [OM.PC] Routine Ot  20 09:38 Ordered


 


 Peripheral IV Insertion Adult [OM.PC] Routine Ot  20 09:38 Ordered


 


 Schedule Procedure [COMM] Per Unit Routine Ot  20 09:38 Ordered


 


 Resuscitation Status Routine Resus Stat  20 04:52 Ordered








 Medication Orders





Butorphanol Tartrate (Stadol)  1 mg IVPUSH Q1H PRN


   PRN Reason: Pain


Carboprost Tromethamine (Hemabate Ds)  250 mcg IM ASDIRECTED PRN


   PRN Reason: Post Partum Hemorrhage


Diphenhydramine HCl (Benadryl)  25 mg IVPUSH Q4H PRN


   PRN Reason: Itching


   Stop: 20 14:33


Fentanyl (Sublimaze)  50 mcg IVPUSH Q1H PRN


   PRN Reason: Pain (severe 7-10)


Lactated Ringer's (Ringers, Lactated)  1,000 mls @ 999 mls/hr IV .BOLUS [facility]


   Last Admin: 20 09:40  Dose: 999 mls/hr


Tranexamic Acid 1,000 mg/ (Sodium Chloride)  110 mls @ 660 mls/hr IV ONETIME PRN


   PRN Reason: Bleeding


Lactated Ringer's (Ringers, Lactated)  1,000 mls @ 150 mls/hr IV ASDIRECTED [facility]


   Last Admin: 20 13:00  Dose: 150 mls/hr


   Infusion: 20 13:00  Dose: 150 mls/hr


   Admin: 20 10:34  Dose: 150 mls/hr


Oxytocin/Sodium Chloride (Oxytocin 30 Unit/500 Ml-Ns)  30 unit in 500 mls @ 999 

mls/hr IV TITRATE [facility]


Lactated Ringer's (Ringers, Lactated)  1,000 mls @ 500 mls/hr IV BOLUS [facility]


Oxytocin/Sodium Chloride (Oxytocin 30 Unit/500 Ml-Ns)  30 unit in 500 mls @ 250 

mls/hr IV TITRATE [facility]


Lidocaine HCl (Xylocaine 1%)  50 ml INJECT ONETIME PRN


   PRN Reason: Laceration repair


Methylergonovine Maleate (Methergine)  0.2 mg IM ASDIRECTED PRN


   PRN Reason: Post Partum Hemorrhage


Misoprostol (Cytotec)  200 mcg PO ONETIME PRN


   PRN Reason: Post Partum Hemorrhage


Nalbuphine HCl (Nubain)  10 mg IVPUSH Q1H PRN


   PRN Reason: Pain (severe 7-10)


Nalbuphine HCl (Nubain)  5 mg IVPUSH ASDIRECTED PRN


   PRN Reason: Itching


Naloxone HCl (Narcan)  0.1 mg IVPUSH ONETIME PRN


   PRN Reason: Respiratory Depression


   Stop: 20 14:33


Ondansetron HCl (Zofran)  4 mg IVPUSH Q6H PRN


   PRN Reason: Nausea


Oxycodone/Acetaminophen (Percocet 325-5 Mg)  2 tab PO Q6H PRN


   PRN Reason: Pain (moderate 4-6)


Sodium Chloride (Saline Flush)  10 ml FLUSH ASDIRECTED PRN


   PRN Reason: Keep Vein Open


Sodium Chloride (Saline Flush)  2.5 ml FLUSH ASDIRECTED PRN


   PRN Reason: Keep Vein Open


Sodium Chloride (Normal Saline)  10 ml IV ASDIRECTED PRN


   PRN Reason: IV Use


Sodium Chloride (Saline Flush)  10 ml FLUSH ASDIRECTED PRN


   PRN Reason: Keep Vein Open


Sodium Chloride (Saline Flush)  2.5 ml FLUSH ASDIRECTED PRN


   PRN Reason: Keep Vein Open


Sodium Chloride (Normal Saline)  10 ml IV ASDIRECTED PRN


   PRN Reason: IV Use


Sodium Chloride (Saline Flush)  10 ml FLUSH ASDIRECTED PRN


   PRN Reason: Keep Vein Open


Sodium Chloride (Saline Flush)  2.5 ml FLUSH ASDIRECTED PRN


   PRN Reason: Keep Vein Open


Sodium Chloride (Normal Saline)  10 ml IV ASDIRECTED PRN


   PRN Reason: IV Use


Sterile Water (Sterile Water For Irrigation)  1,000 ml IRR ASDIRECTED PRN


   PRN Reason: delivery








Assessment/Plan Comment:: 





Intrauterine pregnancy 27 weeks +4 history of incompetent cervix she status 

post cerclage done at 23 weeks today she presented with contraction and lower 

abdominal pain as speculum examination shows that there cervix is opened about 

3 cm with a cerclage was confirmed with the cervical length ultrasound via the 

vaginal probe the patient is breech presentation with protruding amniotic fluid 

bag from by the ultrasound is the patient unstable lie the patient will be 

scheduled for emergency  section and we will try to get the prenatal 

team to fly to here for  resuscitation

## 2020-04-02 NOTE — [id number]
Post Anesthesia Note





- EVALUATION WITHIN 48HRS OF ANESTHETIC


Vital Signs in Normal Range: Yes


Patient Participated in Evaluation: Yes


Respiratory Function Stable: Yes


Airway Patent: Yes


Cardiovascular Function Stable: Yes


Hydration Status Stable: Yes


Pain Control Satisfactory: Yes


Nausea and Vomiting Control Satisfactory: Yes


Mental Status Recovered: Yes


Vital Signs: 


 Last Vital Signs











Temp  99.3 F   04/02/20 04:28


 


Pulse  94   04/02/20 05:00


 


Resp  16   04/02/20 05:00


 


BP  119/74   04/02/20 04:00


 


Pulse Ox  100   04/02/20 05:00














- COMMENTS/OBSERVATIONS


Free Text/Narrative:: 





Pt stable. VSS. No anesthesia complications

## 2020-04-02 NOTE — PCM.PNPP
- General Info


Date of Service: 20


Admission Dx/Problem (Free Text): 


 Patient Status Order with Admit Dx/Problem





20 04:53


Patient Status [ADT] Routine 





20 09:38


Patient Status [ADT] Routine 


Patient Status [ADT] Routine 








 Admission Diagnosis/Problem











Admission Diagnosis/Problem    Pregnancy














Functional Status: Reports: Pain Controlled, Tolerating Diet





- Review of Systems


General: Reports: No Symptoms


HEENT: Reports: No Symptoms


Pulmonary: Reports: No Symptoms


Cardiovascular: Reports: No Symptoms


Gastrointestinal: Reports: No Symptoms


Genitourinary: Reports: No Symptoms


Musculoskeletal: Reports: No Symptoms


Skin: Reports: No Symptoms


Neurological: Reports: No Symptoms


Psychiatric: Reports: No Symptoms





- Patient Data


Vital Signs - Most Recent: 


 Last Vital Signs











Temp  37.4 C   20 04:28


 


Pulse  84   20 06:00


 


Resp  17   20 06:00


 


BP  117/72   20 06:00


 


Pulse Ox  99   20 06:00











Weight - Most Recent: 73.028 kg


I&O - Last 24 Hours: 


 Intake & Output











 20





 22:59 06:59 14:59


 


Intake Total 2000  


 


Output Total 50 375 


 


Balance 1950 -375 











Lab Results - Last 24 Hours: 


 Laboratory Results - last 24 hr











  20 Range/Units





  09:50 09:50 18:52 


 


WBC  16.94 H    (4.0-11.0)  K/uL


 


RBC  3.74 L    (4.30-5.90)  M/uL


 


Hgb  11.0 L    (12.0-16.0)  g/dL


 


Hct  34.8 L    (36.0-46.0)  %


 


MCV  93.0    (80.0-98.0)  fL


 


MCH  29.4    (27.0-32.0)  pg


 


MCHC  31.6    (31.0-37.0)  g/dL


 


RDW Std Deviation  55.5    (28.0-62.0)  fl


 


RDW Coeff of Bryan  16 H    (11.0-15.0)  %


 


Plt Count  264    (150-400)  K/uL


 


MPV  10.40    (7.40-12.00)  fL


 


Neutrophils % (Manual)     (48.0-80.0)  %


 


Band Neutrophils %     %


 


Lymphocytes % (Manual)     (16.0-40.0)  %


 


Monocytes % (Manual)     (0.0-15.0)  %


 


Nucleated RBC %  0.0    /100WBC


 


Absolute Seg Neuts     (1.4-5.7)  


 


Band Neutrophils #     


 


Lymphocytes # (Manual)     (0.6-2.4)  


 


Monocytes # (Manual)     (0.0-0.8)  


 


Nucleated RBCs #  0    K/uL


 


INR    0.96  


 


APTT    28.7  (18.6-31.3)  SEC


 


Blood Type   A POSITIVE   


 


Antibody Screen   NEGATIVE   














  20 Range/Units





  18:52 05:00 


 


WBC  20.57 H   (4.0-11.0)  K/uL


 


RBC  3.17 L   (4.30-5.90)  M/uL


 


Hgb  9.2 L  8.0 L  (12.0-16.0)  g/dL


 


Hct  29.7 L  24.9 L  (36.0-46.0)  %


 


MCV  93.7   (80.0-98.0)  fL


 


MCH  29.0   (27.0-32.0)  pg


 


MCHC  31.0   (31.0-37.0)  g/dL


 


RDW Std Deviation  56.9   (28.0-62.0)  fl


 


RDW Coeff of Bryan  17 H   (11.0-15.0)  %


 


Plt Count  255   (150-400)  K/uL


 


MPV  10.00   (7.40-12.00)  fL


 


Neutrophils % (Manual)  79   (48.0-80.0)  %


 


Band Neutrophils %  14   %


 


Lymphocytes % (Manual)  4 L   (16.0-40.0)  %


 


Monocytes % (Manual)  3   (0.0-15.0)  %


 


Nucleated RBC %  0.0   /100WBC


 


Absolute Seg Neuts  16.3 H   (1.4-5.7)  


 


Band Neutrophils #  2.9   


 


Lymphocytes # (Manual)  0.8   (0.6-2.4)  


 


Monocytes # (Manual)  0.6   (0.0-0.8)  


 


Nucleated RBCs #    K/uL


 


INR    


 


APTT    (18.6-31.3)  SEC


 


Blood Type    


 


Antibody Screen    











Med Orders - Current: 


 Current Medications





Acetaminophen (Tylenol Extra Strength)  500 mg PO Q4H PRN


   PRN Reason: Pain


Acetaminophen (Tylenol Extra Strength)  1,000 mg PO Q4H PRN


   PRN Reason: Pain


Bisacodyl (Dulcolax)  10 mg RECTAL ONETIME PRN


   PRN Reason: Constipation


Diphenhydramine HCl (Benadryl)  25 mg IVPUSH Q4H PRN


   PRN Reason: Itching


   Stop: 20 14:33


Diphenhydramine HCl (Benadryl)  25 mg IVPUSH Q6H PRN


   PRN Reason: Itching or Nausea


Docusate Sodium (Colace)  100 mg PO BID [facility]


   Last Admin: 20 21:37 Dose:  100 mg


Emollient Ointment (Lansinoh Hpa)  0 gm TOP ASDIRECTED PRN


   PRN Reason: Sore Nipples


Fentanyl (Sublimaze)  50 mcg IVPUSH Q1H PRN


   PRN Reason: Pain (severe 7-10)


Lactated Ringer's (Ringers, Lactated)  1,000 mls @ 999 mls/hr IV .BOLUS [facility]


   Last Admin: 20 09:40 Dose:  999 mls/hr


Lactated Ringer's (Ringers, Lactated)  1,000 mls @ 500 mls/hr IV BOLUS [facility]


Oxytocin/Sodium Chloride (Oxytocin 30 Unit/500 Ml-Ns)  30 unit in 500 mls @ 250 

mls/hr IV TITRATE [facility]


Lactated Ringer's (Ringers, Lactated)  1,000 mls @ 125 mls/hr IV ASDIRECTED [facility]


   Last Admin: 20 23:45 Dose:  125 mls/hr


Ibuprofen (Motrin)  800 mg PO Q8H PRN


   PRN Reason: mild pain or fever


Ketorolac Tromethamine (Toradol)  30 mg IVPUSH Q6H [facility]


   Stop: 20 16:01


   Last Admin: 20 04:07 Dose:  30 mg


Methylergonovine Maleate (Methergine)  0.2 mg IM ONETIME PRN


   PRN Reason: Excessive Vaginal Bleeding


Misoprostol (Cytotec)  1,000 mcg RECTAL ONETIME PRN


   PRN Reason: excessive bleeding


Nalbuphine HCl (Nubain)  5 mg IVPUSH ASDIRECTED PRN


   PRN Reason: Itching


   Last Admin: 20 17:35 Dose:  5 mg


Naloxone HCl (Narcan)  0.1 mg IVPUSH ONETIME PRN


   PRN Reason: Respiratory Depression


   Stop: 20 14:33


Ondansetron HCl (Zofran)  4 mg IVPUSH Q6H PRN


   PRN Reason: Nausea


Ondansetron HCl (Zofran)  4 mg IVPUSH Q4H PRN


   PRN Reason: Nausea/Vomiting


Oxycodone HCl (Oxycodone)  5 mg PO Q2H PRN


   PRN Reason: Pain


Oxycodone/Acetaminophen (Percocet 325-5 Mg)  2 tab PO Q6H PRN


   PRN Reason: Pain (moderate 4-6)


Oxycodone/Acetaminophen (Percocet 325-5 Mg)  1 tab PO Q4H PRN


   PRN Reason: Pain (moderate 4-6)


Oxycodone/Acetaminophen (Percocet 325-5 Mg)  2 tab PO Q4H PRN


   PRN Reason: Pain (moderate 4-6)


Oxytocin (Pitocin)  10 unit IM ASDIRECTED PRN


   PRN Reason: Excessive Vaginal Bleeding


Sodium Chloride (Saline Flush)  10 ml FLUSH ASDIRECTED PRN


   PRN Reason: Keep Vein Open


Sodium Chloride (Saline Flush)  2.5 ml FLUSH ASDIRECTED PRN


   PRN Reason: Keep Vein Open


Sodium Chloride (Normal Saline)  10 ml IV ASDIRECTED PRN


   PRN Reason: IV Use


Sodium Chloride (Saline Flush)  2.5 ml FLUSH ASDIRECTED PRN


   PRN Reason: Keep Vein Open


Sodium Chloride (Normal Saline)  10 ml IV ASDIRECTED PRN


   PRN Reason: IV Use


Sodium Chloride (Saline Flush)  10 ml FLUSH ASDIRECTED PRN


   PRN Reason: Keep Vein Open


Sodium Chloride (Saline Flush)  2.5 ml FLUSH ASDIRECTED PRN


   PRN Reason: Keep Vein Open


Sodium Chloride (Normal Saline)  10 ml IV ASDIRECTED PRN


   PRN Reason: IV Use





Discontinued Medications





Benzocaine/Menthol (Dermoplast Pain Relief 20%-0.5% Spray)  78 gm TOP 

ASDIRECTED PRN


   PRN Reason: Perineal Comfort Measure


Betamethasone Acet/Betameth SodPhos (Celestone Soluspan 6 Mg/Ml)  12 mg IM 

ONETIME ONE


   Stop: 20 09:38


   Last Admin: 20 09:47 Dose:  12 mg


Bisacodyl (Dulcolax)  10 mg RECTAL ONETIME PRN


   PRN Reason: Constipation


Butorphanol Tartrate (Stadol)  1 mg IVPUSH Q1H PRN


   PRN Reason: Pain


Carboprost Tromethamine (Hemabate Ds)  250 mcg IM ASDIRECTED PRN


   PRN Reason: Post Partum Hemorrhage


Citric Acid/Sodium Citrate (Bicitra Solution)  30 ml PO ONETIME ONE


   Stop: 20 09:39


   Last Admin: 20 16:05 Dose:  Not Given


Dexamethasone (Dexamethasone)  12 mg IVPUSH ONETIME ONE


   Stop: 20 09:31


Docusate Sodium (Colace)  100 mg PO BID PRN


   PRN Reason: Constipation


Emollient Ointment (Lansinoh Hpa)  0 gm TOP ASDIRECTED PRN


   PRN Reason: Sore Nipples


Ephedrine Sulfate (Ephedrine Sulfate) Confirm Administered Dose 50 mg .ROUTE 

.STK-MED ONE


   Stop: 20 13:01


Tranexamic Acid 1,000 mg/ (Sodium Chloride)  110 mls @ 660 mls/hr IV ONETIME PRN


   PRN Reason: Bleeding


Lactated Ringer's (Ringers, Lactated)  1,000 mls @ 150 mls/hr IV ASDIRECTED [facility]


   Last Admin: 20 13:00 Dose:  150 mls/hr


Oxytocin/Sodium Chloride (Oxytocin 30 Unit/500 Ml-Ns)  30 unit in 500 mls @ 999 

mls/hr IV TITRATE [facility]


Tranexamic Acid 1,000 mg/ (Sodium Chloride)  110 mls @ 660 mls/hr IV ONETIME PRN


   PRN Reason: Bleeding


Ibuprofen (Motrin)  400 mg PO Q4H PRN


   PRN Reason: Pain


Ibuprofen (Motrin)  800 mg PO Q6H PRN


   PRN Reason: Pain


Ketorolac Tromethamine (Toradol) Confirm Administered Dose 15 mg .ROUTE .STK-

MED ONE


   Stop: 20 16:10


Lidocaine HCl (Xylocaine 1%)  50 ml INJECT ONETIME PRN


   PRN Reason: Laceration repair


Methylergonovine Maleate (Methergine)  0.2 mg IM ASDIRECTED PRN


   PRN Reason: Post Partum Hemorrhage


Misoprostol (Cytotec)  200 mcg PO ONETIME PRN


   PRN Reason: Post Partum Hemorrhage


Morphine Sulfate (Duramorph Pf) Confirm Administered Dose 10 mg .ROUTE .STK-MED 

ONE


   Stop: 20 13:01


Nalbuphine HCl (Nubain)  10 mg IVPUSH Q1H PRN


   PRN Reason: Pain (severe 7-10)


Octyl Cyanoacrylate (Dermabond Advance) Confirm Administered Dose 1 applic 

.ROUTE .STK-MED ONE


   Stop: 20 14:08


   Last Admin: 20 16:05 Dose:  Not Given


Ondansetron HCl (Zofran) Confirm Administered Dose 4 mg .ROUTE .STK-MED ONE


   Stop: 20 13:01


Oxytocin (Pitocin) Confirm Administered Dose 20 unit .ROUTE .STK-MED ONE


   Stop: 20 13:01


Sodium Chloride (Saline Flush)  10 ml FLUSH ASDIRECTED PRN


   PRN Reason: Keep Vein Open


Sterile Water (Sterile Water For Irrigation)  1,000 ml IRR ASDIRECTED PRN


   PRN Reason: delivery


Terbutaline Sulfate (Brethine)  0.25 mg SUBCUT ONETIME ONE


   Stop: 20 08:53


   Last Admin: 20 09:35 Dose:  0.25 mg


Terbutaline Sulfate (Brethine)  0.25 mg SUBCUT ONETIME ONE


   Stop: 20 11:00


   Last Admin: 20 11:01 Dose:  0.25 mg


Witch Hazel (Tucks)  1 pad TOP ASDIRECTED PRN


   PRN Reason: comfort care











- Infant Interaction


Infant Disposition, Postpartum: Not Applicable


Infant Interaction: Unable to Hold Infant at this Time


Infant Feeding: Other (see below) (Encourgaged to pump for baby)


Support Person: Significant Other





- Postpartum Recovery Exam


Fundal Tone: Firm


Fundal Level: 1 Fingerbreadths Above Umbilicus


Fundal Placement: Midline


Lochia Amount: Scant


Lochia Color: Rubra/Red


Perineum Description: Intact, Minimal Bruising/Swelling


Episiotomy/Laceration: None


Bladder Status: Indwelling Catheter in Place


Urinary Elimination: Indwelling Catheter





- Exam


General: Alert, Oriented, Cooperative, No Acute Distress


Lungs: Normal Respiratory Effort


GI/Abdominal Exam: Soft, Non-Tender


Extremities: Normal Range of Motion, Non-Tender


Skin: Warm, Dry, Intact


Wound/Incisions: Dressing Dry and Intact


Neurological: No New Focal Deficit, Normal Speech, Normal Tone, Strength Equal 

Bilateral, Sensation Intact


Psy/Mental Status: Alert, Normal Affect, Normal Mood





- Problem List & Annotations


(1)  NB deliv by , 1,250-1,499 gm, 27-28 completed weeks


SNOMED Code(s): 712260154, 458692657, 815805089, [id number]


   Code(s): CJP9716 -    Status: Acute   Priority: High   Current Visit: Yes   





- Problem List Review


Problem List Initiated/Reviewed/Updated: Yes





- Plan


Plan:: 





Intrauterine pregnancy 27 weeks +4 history of incompetent cervix she status 

post cerclage done at 23 weeks today she presented with contraction and lower 

abdominal pain as speculum examination shows that there cervix is opened about 

3 cm with a cerclage was confirmed with the cervical length ultrasound via the 

vaginal probe the patient is breech presentation with protruding amniotic fluid 

bag from by the ultrasound is the patient unstable lie the patient will be 

scheduled for emergency  section and we will try to get the prenatal 

team to fly to here for  resuscitation





PPD#1


A: VSS, AF, dressing dry/intact. Stable


P: encouraged up at bs, pumping breast milk for baby. Disc discharge plan

## 2020-04-03 NOTE — PCM.DCSUM1
**Discharge Summary





- Hospital Course


Diagnosis: Stroke: No





- Discharge Data


Discharge Date: 04/03/20


Discharge Disposition: Home, Self-Care 01


Condition: Good





- Referral to Home Health


Primary Care Physician: 


PCP None








- Patient Summary/Data


Operative Procedure(s) Performed: Primary C/section. removal of Sands 

Cereclage.





- Patient Instructions


Diet: Usual Diet as Tolerated


Activity: As Tolerated


Driving: Do Not Drive


Showering/Bathing: May Shower





- Discharge Plan


Home Medications: 


 Home Meds





Ondansetron [Zofran] 4 mg PO ASDIRECTED PRN 02/24/20 [History]


Promethazine [Phenergan] 25 mg PO ASDIRECTED PRN 02/24/20 [History]


Ferrous Sulfate [Iron] 325 mg PO DAILY 04/01/20 [History]








Referrals: 


[facility] [Outside]


Fran Clemons MD [Physician] - 


(1 week- April 8th@ 1:30pm w/ Dr. Clemons


6 week- May 13th@1:30pm w/ Dr. Clemons)





- Discharge Summary/Plan Comment


DC Time >30 min.: Yes





- General Info


Date of Service: 04/03/20


Functional Status: Reports: Pain Controlled





- Review of Systems


General: Reports: No Symptoms


HEENT: Reports: No Symptoms


Pulmonary: Reports: No Symptoms


Cardiovascular: Reports: No Symptoms


Gastrointestinal: Reports: No Symptoms


Genitourinary: Reports: No Symptoms


Musculoskeletal: Reports: No Symptoms


Skin: Reports: No Symptoms


Neurological: Reports: No Symptoms


Psychiatric: Reports: No Symptoms





- Patient Data


Vitals - Most Recent: 


 Last Vital Signs











Temp  37.1 C   04/03/20 08:22


 


Pulse  88   04/03/20 08:22


 


Resp  18   04/03/20 08:22


 


BP  109/62   04/03/20 08:22


 


Pulse Ox  100   04/03/20 08:22











Weight - Most Recent: 73.028 kg


I&O - Last 24 hours: 


 Intake & Output











 04/02/20 04/03/20 04/03/20





 22:59 06:59 14:59


 


Intake Total 980  


 


Output Total 600  


 


Balance 380  











Med Orders - Current: 


 Current Medications





Acetaminophen (Tylenol Extra Strength)  500 mg PO Q4H PRN


   PRN Reason: Pain


Acetaminophen (Tylenol Extra Strength)  1,000 mg PO Q4H PRN


   PRN Reason: Pain


Bisacodyl (Dulcolax)  10 mg RECTAL ONETIME PRN


   PRN Reason: Constipation


Diphenhydramine HCl (Benadryl)  25 mg IVPUSH Q6H PRN


   PRN Reason: Itching or Nausea


Docusate Sodium (Colace)  100 mg PO BID [facility]


   Last Admin: 04/03/20 09:03 Dose:  100 mg


Emollient Ointment (Lansinoh Hpa)  0 gm TOP ASDIRECTED PRN


   PRN Reason: Sore Nipples


Fentanyl (Sublimaze)  50 mcg IVPUSH Q1H PRN


   PRN Reason: Pain (severe 7-10)


Lactated Ringer's (Ringers, Lactated)  1,000 mls @ 999 mls/hr IV .BOLUS [facility]


   Last Admin: 04/01/20 09:40 Dose:  999 mls/hr


Lactated Ringer's (Ringers, Lactated)  1,000 mls @ 500 mls/hr IV BOLUS [facility]


Oxytocin/Sodium Chloride (Oxytocin 30 Unit/500 Ml-Ns)  30 unit in 500 mls @ 250 

mls/hr IV TITRATE [facility]


Lactated Ringer's (Ringers, Lactated)  1,000 mls @ 125 mls/hr IV ASDIRECTED [facility]


   Last Admin: 04/02/20 07:41 Dose:  125 mls/hr


Ibuprofen (Motrin)  800 mg PO Q8H PRN


   PRN Reason: mild pain or fever


Methylergonovine Maleate (Methergine)  0.2 mg IM ONETIME PRN


   PRN Reason: Excessive Vaginal Bleeding


Misoprostol (Cytotec)  1,000 mcg RECTAL ONETIME PRN


   PRN Reason: excessive bleeding


Nalbuphine HCl (Nubain)  5 mg IVPUSH ASDIRECTED PRN


   PRN Reason: Itching


   Last Admin: 04/01/20 17:35 Dose:  5 mg


Ondansetron HCl (Zofran)  4 mg IVPUSH Q6H PRN


   PRN Reason: Nausea


Ondansetron HCl (Zofran)  4 mg IVPUSH Q4H PRN


   PRN Reason: Nausea/Vomiting


Oxycodone HCl (Oxycodone)  5 mg PO Q2H PRN


   PRN Reason: Pain


Oxycodone/Acetaminophen (Percocet 325-5 Mg)  2 tab PO Q6H PRN


   PRN Reason: Pain (moderate 4-6)


   Last Admin: 04/02/20 20:45 Dose:  2 tab


Oxycodone/Acetaminophen (Percocet 325-5 Mg)  1 tab PO Q4H PRN


   PRN Reason: Pain (moderate 4-6)


Oxycodone/Acetaminophen (Percocet 325-5 Mg)  2 tab PO Q4H PRN


   PRN Reason: Pain (moderate 4-6)


   Last Admin: 04/03/20 04:42 Dose:  2 tab


Oxytocin (Pitocin)  10 unit IM ASDIRECTED PRN


   PRN Reason: Excessive Vaginal Bleeding


Sodium Chloride (Saline Flush)  10 ml FLUSH ASDIRECTED PRN


   PRN Reason: Keep Vein Open


Sodium Chloride (Saline Flush)  2.5 ml FLUSH ASDIRECTED PRN


   PRN Reason: Keep Vein Open


Sodium Chloride (Normal Saline)  10 ml IV ASDIRECTED PRN


   PRN Reason: IV Use


Sodium Chloride (Saline Flush)  2.5 ml FLUSH ASDIRECTED PRN


   PRN Reason: Keep Vein Open


Sodium Chloride (Normal Saline)  10 ml IV ASDIRECTED PRN


   PRN Reason: IV Use


Sodium Chloride (Saline Flush)  10 ml FLUSH ASDIRECTED PRN


   PRN Reason: Keep Vein Open


Sodium Chloride (Saline Flush)  2.5 ml FLUSH ASDIRECTED PRN


   PRN Reason: Keep Vein Open


Sodium Chloride (Normal Saline)  10 ml IV ASDIRECTED PRN


   PRN Reason: IV Use





Discontinued Medications





Benzocaine/Menthol (Dermoplast Pain Relief 20%-0.5% Spray)  78 gm TOP 

ASDIRECTED PRN


   PRN Reason: Perineal Comfort Measure


Betamethasone Acet/Betameth SodPhos (Celestone Soluspan 6 Mg/Ml)  12 mg IM 

ONETIME ONE


   Stop: 04/01/20 09:38


   Last Admin: 04/01/20 09:47 Dose:  12 mg


Bisacodyl (Dulcolax)  10 mg RECTAL ONETIME PRN


   PRN Reason: Constipation


Butorphanol Tartrate (Stadol)  1 mg IVPUSH Q1H PRN


   PRN Reason: Pain


Carboprost Tromethamine (Hemabate Ds)  250 mcg IM ASDIRECTED PRN


   PRN Reason: Post Partum Hemorrhage


Citric Acid/Sodium Citrate (Bicitra Solution)  30 ml PO ONETIME ONE


   Stop: 04/01/20 09:39


   Last Admin: 04/01/20 16:05 Dose:  Not Given


Dexamethasone (Dexamethasone)  12 mg IVPUSH ONETIME ONE


   Stop: 04/01/20 09:31


Diphenhydramine HCl (Benadryl)  25 mg IVPUSH Q4H PRN


   PRN Reason: Itching


   Stop: 04/02/20 14:33


Docusate Sodium (Colace)  100 mg PO BID PRN


   PRN Reason: Constipation


Emollient Ointment (Lansinoh Hpa)  0 gm TOP ASDIRECTED PRN


   PRN Reason: Sore Nipples


Ephedrine Sulfate (Ephedrine Sulfate) Confirm Administered Dose 50 mg .ROUTE 

.STK-MED ONE


   Stop: 04/01/20 13:01


Tranexamic Acid 1,000 mg/ (Sodium Chloride)  110 mls @ 660 mls/hr IV ONETIME PRN


   PRN Reason: Bleeding


Lactated Ringer's (Ringers, Lactated)  1,000 mls @ 150 mls/hr IV ASDIRECTED [facility]


   Last Admin: 04/01/20 13:00 Dose:  150 mls/hr


Oxytocin/Sodium Chloride (Oxytocin 30 Unit/500 Ml-Ns)  30 unit in 500 mls @ 999 

mls/hr IV TITRATE [facility]


Tranexamic Acid 1,000 mg/ (Sodium Chloride)  110 mls @ 660 mls/hr IV ONETIME PRN


   PRN Reason: Bleeding


Ibuprofen (Motrin)  400 mg PO Q4H PRN


   PRN Reason: Pain


Ibuprofen (Motrin)  800 mg PO Q6H PRN


   PRN Reason: Pain


Ketorolac Tromethamine (Toradol)  30 mg IVPUSH Q6H [facility]


   Stop: 04/02/20 16:01


   Last Admin: 04/02/20 09:51 Dose:  30 mg


Ketorolac Tromethamine (Toradol) Confirm Administered Dose 15 mg .ROUTE .STK-

MED ONE


   Stop: 04/01/20 16:10


Lidocaine HCl (Xylocaine 1%)  50 ml INJECT ONETIME PRN


   PRN Reason: Laceration repair


Methylergonovine Maleate (Methergine)  0.2 mg IM ASDIRECTED PRN


   PRN Reason: Post Partum Hemorrhage


Misoprostol (Cytotec)  200 mcg PO ONETIME PRN


   PRN Reason: Post Partum Hemorrhage


Morphine Sulfate (Duramorph Pf) Confirm Administered Dose 10 mg .ROUTE .STK-MED 

ONE


   Stop: 04/01/20 13:01


Nalbuphine HCl (Nubain)  10 mg IVPUSH Q1H PRN


   PRN Reason: Pain (severe 7-10)


Naloxone HCl (Narcan)  0.1 mg IVPUSH ONETIME PRN


   PRN Reason: Respiratory Depression


   Stop: 04/02/20 14:33


Octyl Cyanoacrylate (Dermabond Advance) Confirm Administered Dose 1 applic 

.ROUTE .STK-MED ONE


   Stop: 04/01/20 14:08


   Last Admin: 04/01/20 16:05 Dose:  Not Given


Ondansetron HCl (Zofran) Confirm Administered Dose 4 mg .ROUTE .STK-MED ONE


   Stop: 04/01/20 13:01


Oxytocin (Pitocin) Confirm Administered Dose 20 unit .ROUTE .STK-MED ONE


   Stop: 04/01/20 13:01


Sodium Chloride (Saline Flush)  10 ml FLUSH ASDIRECTED PRN


   PRN Reason: Keep Vein Open


Sterile Water (Sterile Water For Irrigation)  1,000 ml IRR ASDIRECTED PRN


   PRN Reason: delivery


Terbutaline Sulfate (Brethine)  0.25 mg SUBCUT ONETIME ONE


   Stop: 04/01/20 08:53


   Last Admin: 04/01/20 09:35 Dose:  0.25 mg


Terbutaline Sulfate (Brethine)  0.25 mg SUBCUT ONETIME ONE


   Stop: 04/01/20 11:00


   Last Admin: 04/01/20 11:01 Dose:  0.25 mg


Witch Hazel (Tucks)  1 pad TOP ASDIRECTED PRN


   PRN Reason: comfort care











- Exam


General: Reports: Alert, Oriented


HEENT: Reports: Pupils Equal, Pupils Reactive, EOMI, Mucous Membr. Moist/Pink


Neck: Reports: Supple


Lungs: Reports: Clear to Auscultation, Normal Respiratory Effort


Cardiovascular: Reports: Regular Rate, Regular Rhythm


GI/Abdominal Exam: Normal Bowel Sounds, Soft, Non-Tender, No Organomegaly, No 

Distention, No Abnormal Bruit, No Mass, Pelvis Stable


 (Female) Exam: Normal External Exam, Normal Speculum Exam, Normal Bimanual 

Exam


Rectal (Female) Exam: Normal Exam, Normal Rectal Tone


Back Exam: Reports: Normal Inspection, Full Range of Motion


Extremities: Normal Inspection, Normal Range of Motion, Non-Tender, No Pedal 

Edema, Normal Capillary Refill


Skin: Reports: Warm, Dry, Intact


Wound/Incisions: Reports: Healing Well


Neurological: Reports: No New Focal Deficit


Psy/Mental Status: Reports: Alert, Normal Affect, Normal Mood

## 2020-04-03 NOTE — PCM.PNPP
- General Info


Date of Service: 20


Functional Status: Reports: Pain Controlled





- Review of Systems


General: Reports: No Symptoms


HEENT: Reports: No Symptoms


Pulmonary: Reports: No Symptoms


Cardiovascular: Reports: No Symptoms


Gastrointestinal: Reports: No Symptoms


Genitourinary: Reports: No Symptoms


Musculoskeletal: Reports: No Symptoms


Skin: Reports: No Symptoms


Neurological: Reports: No Symptoms


Psychiatric: Reports: No Symptoms





- General Info


Date of Service: 20





- Patient Data


Vital Signs - Most Recent: 


 Last Vital Signs











Temp  37.1 C   20 08:22


 


Pulse  88   20 08:22


 


Resp  18   20 08:22


 


BP  109/62   20 08:22


 


Pulse Ox  100   20 08:22











Weight - Most Recent: 73.028 kg


I&O - Last 24 Hours: 


 Intake & Output











 20





 22:59 06:59 14:59


 


Intake Total 980  


 


Output Total 600  


 


Balance 380  











Med Orders - Current: 


 Current Medications





Acetaminophen (Tylenol Extra Strength)  500 mg PO Q4H PRN


   PRN Reason: Pain


Acetaminophen (Tylenol Extra Strength)  1,000 mg PO Q4H PRN


   PRN Reason: Pain


Bisacodyl (Dulcolax)  10 mg RECTAL ONETIME PRN


   PRN Reason: Constipation


Diphenhydramine HCl (Benadryl)  25 mg IVPUSH Q6H PRN


   PRN Reason: Itching or Nausea


Docusate Sodium (Colace)  100 mg PO BID [facility]


   Last Admin: 20 09:03 Dose:  100 mg


Emollient Ointment (Lansinoh Hpa)  0 gm TOP ASDIRECTED PRN


   PRN Reason: Sore Nipples


Fentanyl (Sublimaze)  50 mcg IVPUSH Q1H PRN


   PRN Reason: Pain (severe 7-10)


Lactated Ringer's (Ringers, Lactated)  1,000 mls @ 999 mls/hr IV .BOLUS [facility]


   Last Admin: 20 09:40 Dose:  999 mls/hr


Lactated Ringer's (Ringers, Lactated)  1,000 mls @ 500 mls/hr IV BOLUS [facility]


Oxytocin/Sodium Chloride (Oxytocin 30 Unit/500 Ml-Ns)  30 unit in 500 mls @ 250 

mls/hr IV TITRATE [facility]


Lactated Ringer's (Ringers, Lactated)  1,000 mls @ 125 mls/hr IV ASDIRECTED [facility]


   Last Admin: 20 07:41 Dose:  125 mls/hr


Ibuprofen (Motrin)  800 mg PO Q8H PRN


   PRN Reason: mild pain or fever


Methylergonovine Maleate (Methergine)  0.2 mg IM ONETIME PRN


   PRN Reason: Excessive Vaginal Bleeding


Misoprostol (Cytotec)  1,000 mcg RECTAL ONETIME PRN


   PRN Reason: excessive bleeding


Nalbuphine HCl (Nubain)  5 mg IVPUSH ASDIRECTED PRN


   PRN Reason: Itching


   Last Admin: 20 17:35 Dose:  5 mg


Ondansetron HCl (Zofran)  4 mg IVPUSH Q6H PRN


   PRN Reason: Nausea


Ondansetron HCl (Zofran)  4 mg IVPUSH Q4H PRN


   PRN Reason: Nausea/Vomiting


Oxycodone HCl (Oxycodone)  5 mg PO Q2H PRN


   PRN Reason: Pain


Oxycodone/Acetaminophen (Percocet 325-5 Mg)  2 tab PO Q6H PRN


   PRN Reason: Pain (moderate 4-6)


   Last Admin: 20 20:45 Dose:  2 tab


Oxycodone/Acetaminophen (Percocet 325-5 Mg)  1 tab PO Q4H PRN


   PRN Reason: Pain (moderate 4-6)


Oxycodone/Acetaminophen (Percocet 325-5 Mg)  2 tab PO Q4H PRN


   PRN Reason: Pain (moderate 4-6)


   Last Admin: 20 04:42 Dose:  2 tab


Oxytocin (Pitocin)  10 unit IM ASDIRECTED PRN


   PRN Reason: Excessive Vaginal Bleeding


Sodium Chloride (Saline Flush)  10 ml FLUSH ASDIRECTED PRN


   PRN Reason: Keep Vein Open


Sodium Chloride (Saline Flush)  2.5 ml FLUSH ASDIRECTED PRN


   PRN Reason: Keep Vein Open


Sodium Chloride (Normal Saline)  10 ml IV ASDIRECTED PRN


   PRN Reason: IV Use


Sodium Chloride (Saline Flush)  2.5 ml FLUSH ASDIRECTED PRN


   PRN Reason: Keep Vein Open


Sodium Chloride (Normal Saline)  10 ml IV ASDIRECTED PRN


   PRN Reason: IV Use


Sodium Chloride (Saline Flush)  10 ml FLUSH ASDIRECTED PRN


   PRN Reason: Keep Vein Open


Sodium Chloride (Saline Flush)  2.5 ml FLUSH ASDIRECTED PRN


   PRN Reason: Keep Vein Open


Sodium Chloride (Normal Saline)  10 ml IV ASDIRECTED PRN


   PRN Reason: IV Use





Discontinued Medications





Benzocaine/Menthol (Dermoplast Pain Relief 20%-0.5% Spray)  78 gm TOP 

ASDIRECTED PRN


   PRN Reason: Perineal Comfort Measure


Betamethasone Acet/Betameth SodPhos (Celestone Soluspan 6 Mg/Ml)  12 mg IM 

ONETIME ONE


   Stop: 20 09:38


   Last Admin: 20 09:47 Dose:  12 mg


Bisacodyl (Dulcolax)  10 mg RECTAL ONETIME PRN


   PRN Reason: Constipation


Butorphanol Tartrate (Stadol)  1 mg IVPUSH Q1H PRN


   PRN Reason: Pain


Carboprost Tromethamine (Hemabate Ds)  250 mcg IM ASDIRECTED PRN


   PRN Reason: Post Partum Hemorrhage


Citric Acid/Sodium Citrate (Bicitra Solution)  30 ml PO ONETIME ONE


   Stop: 20 09:39


   Last Admin: 20 16:05 Dose:  Not Given


Dexamethasone (Dexamethasone)  12 mg IVPUSH ONETIME ONE


   Stop: 20 09:31


Diphenhydramine HCl (Benadryl)  25 mg IVPUSH Q4H PRN


   PRN Reason: Itching


   Stop: 20 14:33


Docusate Sodium (Colace)  100 mg PO BID PRN


   PRN Reason: Constipation


Emollient Ointment (Lansinoh Hpa)  0 gm TOP ASDIRECTED PRN


   PRN Reason: Sore Nipples


Ephedrine Sulfate (Ephedrine Sulfate) Confirm Administered Dose 50 mg .ROUTE 

.STK-MED ONE


   Stop: 20 13:01


Tranexamic Acid 1,000 mg/ (Sodium Chloride)  110 mls @ 660 mls/hr IV ONETIME PRN


   PRN Reason: Bleeding


Lactated Ringer's (Ringers, Lactated)  1,000 mls @ 150 mls/hr IV ASDIRECTED MAXIMO


   Last Admin: 20 13:00 Dose:  150 mls/hr


Oxytocin/Sodium Chloride (Oxytocin 30 Unit/500 Ml-Ns)  30 unit in 500 mls @ 999 

mls/hr IV TITRATE [facility]


Tranexamic Acid 1,000 mg/ (Sodium Chloride)  110 mls @ 660 mls/hr IV ONETIME PRN


   PRN Reason: Bleeding


Ibuprofen (Motrin)  400 mg PO Q4H PRN


   PRN Reason: Pain


Ibuprofen (Motrin)  800 mg PO Q6H PRN


   PRN Reason: Pain


Ketorolac Tromethamine (Toradol)  30 mg IVPUSH Q6H [facility]


   Stop: 20 16:01


   Last Admin: 20 09:51 Dose:  30 mg


Ketorolac Tromethamine (Toradol) Confirm Administered Dose 15 mg .ROUTE .STK-

MED ONE


   Stop: 20 16:10


Lidocaine HCl (Xylocaine 1%)  50 ml INJECT ONETIME PRN


   PRN Reason: Laceration repair


Methylergonovine Maleate (Methergine)  0.2 mg IM ASDIRECTED PRN


   PRN Reason: Post Partum Hemorrhage


Misoprostol (Cytotec)  200 mcg PO ONETIME PRN


   PRN Reason: Post Partum Hemorrhage


Morphine Sulfate (Duramorph Pf) Confirm Administered Dose 10 mg .ROUTE .STK-MED 

ONE


   Stop: 20 13:01


Nalbuphine HCl (Nubain)  10 mg IVPUSH Q1H PRN


   PRN Reason: Pain (severe 7-10)


Naloxone HCl (Narcan)  0.1 mg IVPUSH ONETIME PRN


   PRN Reason: Respiratory Depression


   Stop: 20 14:33


Octyl Cyanoacrylate (Dermabond Advance) Confirm Administered Dose 1 applic 

.ROUTE .STK-MED ONE


   Stop: 20 14:08


   Last Admin: 20 16:05 Dose:  Not Given


Ondansetron HCl (Zofran) Confirm Administered Dose 4 mg .ROUTE .STK-MED ONE


   Stop: 20 13:01


Oxytocin (Pitocin) Confirm Administered Dose 20 unit .ROUTE .STK-MED ONE


   Stop: 20 13:01


Sodium Chloride (Saline Flush)  10 ml FLUSH ASDIRECTED PRN


   PRN Reason: Keep Vein Open


Sterile Water (Sterile Water For Irrigation)  1,000 ml IRR ASDIRECTED PRN


   PRN Reason: delivery


Terbutaline Sulfate (Brethine)  0.25 mg SUBCUT ONETIME ONE


   Stop: 20 08:53


   Last Admin: 20 09:35 Dose:  0.25 mg


Terbutaline Sulfate (Brethine)  0.25 mg SUBCUT ONETIME ONE


   Stop: 20 11:00


   Last Admin: 20 11:01 Dose:  0.25 mg


Witch Hazel (Tucks)  1 pad TOP ASDIRECTED PRN


   PRN Reason: comfort care











- Infant Interaction


Infant Disposition, Postpartum: Not Applicable


Infant Interaction: Unable to Hold Infant at this Time


Infant Feeding: Other (see below) (Encourgaged to pump for baby)


Support Person: Significant Other





- Postpartum Recovery Exam


Fundal Tone: Firm


Fundal Level: 1 Fingerbreadths Below Umbilicus


Fundal Placement: Midline


Lochia Amount: Small


Lochia Color: Rubra/Red


Perineum Description: Intact, Minimal Bruising/Swelling


Episiotomy/Laceration: None


Bladder Status: Voiding


Urinary Elimination: Indwelling Catheter





- Exam


General: Alert, Oriented


HEENT: Pupils Equal


Neck: Supple


Lungs: Clear to Auscultation, Normal Respiratory Effort


Cardiovascular: Regular Rate, Regular Rhythm


GI/Abdominal Exam: Normal Bowel Sounds, Soft, Non-Tender, No Organomegaly, No 

Distention, No Abnormal Bruit, No Mass, Pelvis Stable


Extremities: Normal Inspection, Normal Range of Motion, Non-Tender, No Pedal 

Edema, Normal Capillary Refill


Skin: Warm, Dry, Intact


Wound/Incisions: Healing Well


Neurological: No New Focal Deficit


Psy/Mental Status: Alert, Normal Affect, Normal Mood





- Problem List Review


Problem List Initiated/Reviewed/Updated: Yes





- My Orders


Last 24 Hours: 


My Active Orders





20 22:00


Ibuprofen [Motrin]   800 mg PO Q8H PRN 





20 Dinner


Regular Diet [DIET] 














- Assessment


Assessment:: 





 section doing well sent home today





- Plan


Plan:: 





Intrauterine pregnancy 27 weeks +4 history of incompetent cervix she status 

post cerclage done at 23 weeks today she presented with contraction and lower 

abdominal pain as speculum examination shows that there cervix is opened about 

3 cm with a cerclage was confirmed with the cervical length ultrasound via the 

vaginal probe the patient is breech presentation with protruding amniotic fluid 

bag from by the ultrasound is the patient unstable lie the patient will be 

scheduled for emergency  section and we will try to get the prenatal 

team to fly to here for  resuscitation





PPD#1


A: VSS, AF, dressing dry/intact. Stable


P: encouraged up at bs, pumping breast milk for baby. Disc discharge plan

## 2021-08-05 NOTE — PCM.PREANE
Preanesthetic Assessment





- Anesthesia/Transfusion/Family Hx


Anesthesia History: Prior Anesthesia Without Reaction


Transfusion History: No Prior Transfusion(s)


Type of Transfusion Reactions: Reports: Unknown


Intubation History: Unknown





- Review of Systems


General: No Symptoms


Pulmonary: No Symptoms


Cardiovascular: No Symptoms


Gastrointestinal: No Symptoms


Neurological: No Symptoms


Other: Reports: None





- Physical Assessment


NPO Status Date: 21


NPO Status Time: 00:00


Height: 5 ft 5 in


Weight: 181 lb


ASA Class: 2


Mental Status: Alert & Oriented x3


Airway Class: Mallampati = 2


Dentition: Reports: Normal Dentition


Thyro-Mental Finger Breadths: 3


Mouth Opening Finger Breadths: 3


ROM/Head Extension: Full


Lungs: Clear to Auscultation, Normal Respiratory Effort


Cardiovascular: Regular Rate, Regular Rhythm





- Allergies


Allergies/Adverse Reactions: 


                                    Allergies











Allergy/AdvReac Type Severity Reaction Status Date / Time


 


No Known Allergies Allergy   Verified 21 13:21














- Acknowledgements


Anesthesia Type Planned: Spinal


Pt an Appropriate Candidate for the Planned Anesthesia: Yes


Alternatives and Risks of Anesthesia Discussed w Pt/Guardian: Yes


Pt/Guardian Understands and Agrees with Anesthesia Plan: Yes





PreAnesthesia Questionnaire


HEENT History: Reports: None


Cardiovascular History: Reports: None


Respiratory History: Reports: None


Gastrointestinal History: Reports: Other (See Below)


Other Gastrointestinal History: N&V with pregnancy


Genitourinary History: Reports: None


Other Genitourinary History: Incompetent cervix


OB/GYN History: Reports: Pregnancy


Musculoskeletal History: Reports: None


Neurological History: Reports: None


Psychiatric History: Reports: None


Endocrine/Metabolic History: Reports: None


Hematologic History: Reports: None


Immunologic History: Reports: None


Oncologic (Cancer) History: Reports: None


Dermatologic History: Reports: None





- Infectious Disease History


Infectious Disease History: Reports: None





- Past Surgical History


Head Surgeries/Procedures: Reports: None


HEENT Surgical History: Reports: None


Cardiovascular Surgical History: Reports: None


Respiratory Surgical History: Reports: None


GI Surgical History: Reports: None


Female  Surgical History: Reports:  Section, Other (See Below)


Other Female  Surgeries/Procedures: cervical cerclage


Endocrine Surgical History: Reports: None


Neurological Surgical History: Reports: None


Musculoskeletal Surgical History: Reports: None


Oncologic Surgical History: Reports: None


Dermatological Surgical History: Reports: None





- SUBSTANCE USE


Tobacco Use Status *Q: Never Tobacco User


Recreational Drug Use History: No





- HOME MEDS


Home Medications: 


                                    Home Meds





Promethazine [Phenergan] 25 mg PO BID PRN 20 [History]


Folic Acid 0.4 mg PO DAILY 21 [History]


Pnv No.95/Ferrous Fum/Folic AC [Prenatal Caplet] 1 cap PO DAILY 21 

[History]











- CURRENT (IN HOUSE) MEDS


Current Meds: 





                               Current Medications





Albuterol (Albuterol 0.083% 2.5 Mg/3 Ml Neb Soln)  2.5 mg NEB ONETIME PRN


   PRN Reason: Wheezing


Droperidol (Droperidol 5 Mg/2 Ml Sdv)  0.625 mg IVPUSH ONETIME PRN


   PRN Reason: Nausea/Vomiting


Fentanyl (Fentanyl 100 Mcg/2 Ml Sdv)  50 mcg IVPUSH Q5M PRN


   PRN Reason: Pain (mild 1-3)


Hydromorphone HCl (Hydromorphone 1 Mg/Ml Syringe)  1 mg IVPUSH Q10M PRN


   PRN Reason: Pain (moderate 4-6)


Lactated Ringer's (Ringers, Lactated)  1,000 mls @ 100 mls/hr IV ASDIRECTED MAXIMO


Metoclopramide HCl (Metoclopramide 10 Mg/2 Ml Sdv)  10 mg IVPUSH ONETIME PRN


   PRN Reason: Nausea/Vomiting


Morphine Sulfate (Morphine 2 Mg/Ml Syringe)  2 mg IVPUSH Q10M PRN


   PRN Reason: Pain (severe 7-10)


Naloxone HCl (Naloxone 0.4 Mg/Ml Syringe)  0.1 mg IVPUSH ASDIRECTED PRN


   PRN Reason: Respiratory Depression


Ondansetron HCl (Ondansetron 4 Mg/2 Ml Sdv)  4 mg IVPUSH ONETIME PRN


   PRN Reason: Nausea/Vomiting





Discontinued Medications





Chloroprocaine HCl (Chloroprocaine 10 Mg/Ml 5 Ml Amp) Confirm Administered Dose 

5 ml .ROUTE .STK-MED ONE


   Stop: 21 08:32

## 2021-08-05 NOTE — [id number]
Post Anesthesia Note





- EVALUATION WITHIN 48HRS OF ANESTHETIC


Vital Signs in Normal Range: Yes


Patient Participated in Evaluation: Yes


Respiratory Function Stable: Yes


Airway Patent: Yes


Cardiovascular Function Stable: Yes


Hydration Status Stable: Yes


Pain Control Satisfactory: Yes


Nausea and Vomiting Control Satisfactory: Yes


Mental Status Recovered: Yes


Vital Signs: 


                                Last Vital Signs











Temp  97.5 F   08/05/21 10:53


 


Pulse  73   08/05/21 11:04


 


Resp  14   08/05/21 11:04


 


BP  116/66   08/05/21 11:04


 


Pulse Ox  100   08/05/21 11:04

## 2021-08-05 NOTE — PCM.DCSUM1
**Discharge Summary





- Hospital Course


Diagnosis: Stroke: No





- Discharge Data


Discharge Date: 08/05/21


Discharge Disposition: Home, Self-Care 01


Condition: Good





- Referral to Home Health


Primary Care Physician: 


Fran Clemons MD








- Patient Summary/Data


Operative Procedure(s) Performed: Gomez Cerclage





- Patient Instructions


Diet: Usual Diet as Tolerated


Activity: As Tolerated


Driving: Do Not Drive


Showering/Bathing: May Shower


Notify Provider of: Fever, Increased Pain, Swelling and Redness, Drainage, 

Nausea and/or Vomiting





- Discharge Plan


Home Medications: 


                                    Home Meds





Promethazine [Phenergan] 25 mg PO BID PRN 02/24/20 [History]


Folic Acid 0.4 mg PO DAILY 07/30/21 [History]


Pnv No.95/Ferrous Fum/Folic AC [Prenatal Caplet] 1 cap PO DAILY 07/30/21 

[History]











- Discharge Summary/Plan Comment


DC Time >30 min.: Yes





- General Info


Date of Service: 08/05/21


Functional Status: Reports: Pain Controlled





- Review of Systems


General: Reports: No Symptoms


HEENT: Reports: No Symptoms


Pulmonary: Reports: No Symptoms


Cardiovascular: Reports: No Symptoms


Gastrointestinal: Reports: No Symptoms


Genitourinary: Reports: No Symptoms


Musculoskeletal: Reports: No Symptoms


Skin: Reports: No Symptoms


Neurological: Reports: No Symptoms


Psychiatric: Reports: No Symptoms





- Patient Data


Vitals - Most Recent: 


                                Last Vital Signs











Temp  36.4 C   08/05/21 09:30


 


Pulse  82   08/05/21 09:30


 


Resp  15   08/05/21 09:30


 


BP  123/73   08/05/21 09:30


 


Pulse Ox  100   08/05/21 09:30











Weight - Most Recent: 82.1 kg


Med Orders - Current: 


                               Current Medications





Albuterol (Albuterol 0.083% 2.5 Mg/3 Ml Neb Soln)  2.5 mg NEB ONETIME PRN


   PRN Reason: Wheezing


Droperidol (Droperidol 5 Mg/2 Ml Sdv)  0.625 mg IVPUSH ONETIME PRN


   PRN Reason: Nausea/Vomiting


Fentanyl (Fentanyl 100 Mcg/2 Ml Sdv)  50 mcg IVPUSH Q5M PRN


   PRN Reason: Pain (mild 1-3)


Hydromorphone HCl (Hydromorphone 1 Mg/Ml Syringe)  1 mg IVPUSH Q10M PRN


   PRN Reason: Pain (moderate 4-6)


Lactated Ringer's (Ringers, Lactated)  1,000 mls @ 100 mls/hr IV ASDIRECTED MAXIMO


   Last Admin: 08/05/21 09:50 Dose:  100 mls/hr


   Documented by: 


Metoclopramide HCl (Metoclopramide 10 Mg/2 Ml Sdv)  10 mg IVPUSH ONETIME PRN


   PRN Reason: Nausea/Vomiting


Morphine Sulfate (Morphine 2 Mg/Ml Syringe)  2 mg IVPUSH Q10M PRN


   PRN Reason: Pain (severe 7-10)


Naloxone HCl (Naloxone 0.4 Mg/Ml Syringe)  0.1 mg IVPUSH ASDIRECTED PRN


   PRN Reason: Respiratory Depression


Ondansetron HCl (Ondansetron 4 Mg/2 Ml Sdv)  4 mg IVPUSH ONETIME PRN


   PRN Reason: Nausea/Vomiting





Discontinued Medications





Chloroprocaine HCl (Chloroprocaine 10 Mg/Ml 5 Ml Amp) Confirm Administered Dose 

5 ml .ROUTE .STK-MED ONE


   Stop: 08/05/21 08:32


Acetaminophen (Ofirmev 1000 Mg/100 Ml) Confirm Administered Dose 100 mls @ as 

directed .ROUTE .STK-MED ONE


   Stop: 08/05/21 10:09


Lidocaine HCl (Lidocaine 1% 5 Ml Sdv) Confirm Administered Dose 5 ml .ROUTE 

.STK-MED ONE


   Stop: 08/05/21 10:10


Propofol (Propofol 200 Mg/20 Ml Sdv) Confirm Administered Dose 200 mg .ROUTE 

.STK-MED ONE


   Stop: 08/05/21 10:09











- Exam


General: Reports: Alert, Oriented


HEENT: Reports: Pupils Equal, Pupils Reactive, EOMI, Mucous Membr. Moist/Pink


Neck: Reports: Supple


Lungs: Reports: Clear to Auscultation, Normal Respiratory Effort


Cardiovascular: Reports: Regular Rate, Regular Rhythm


GI/Abdominal Exam: Normal Bowel Sounds, Soft, Non-Tender, No Organomegaly, No 

Distention, No Abnormal Bruit, No Mass, Pelvis Stable


 (Female) Exam: Normal External Exam, Normal Speculum Exam, Normal Bimanual 

Exam


Rectal (Female) Exam: Normal Exam, Normal Rectal Tone


Back Exam: Reports: Normal Inspection, Full Range of Motion


Extremities: Normal Inspection, Normal Range of Motion, Non-Tender, No Pedal 

Edema, Normal Capillary Refill


Skin: Reports: Warm, Dry, Intact


Wound/Incisions: Reports: Healing Well


Neurological: Reports: No New Focal Deficit


Psy/Mental Status: Reports: Alert, Normal Affect, Normal Mood

## 2021-08-05 NOTE — PCM.POSTAN
POST ANESTHESIA ASSESSMENT





- MENTAL STATUS


Mental Status: Alert, Oriented





- VITAL SIGNS


Vital Signs: 


                                Last Vital Signs











Temp  97.5 F   08/05/21 10:53


 


Pulse  73   08/05/21 11:04


 


Resp  14   08/05/21 11:04


 


BP  116/66   08/05/21 11:04


 


Pulse Ox  100   08/05/21 11:04














- RESPIRATORY


Respiratory Status: Respiratory Rate WNL, Airway Patent, O2 Saturation Stable





- CARDIOVASCULAR


CV Status: Pulse Rate WNL, Blood Pressure Stable





- GASTROINTESTINAL


GI Status: No Symptoms





- POST OP HYDRATION


Hydration Status: Adequate & Stable

## 2021-08-06 NOTE — OR
SURGEON:

Fran Clemons MD

 

DATE OF PROCEDURE:  08/05/2021

 

PREOPERATIVE DIAGNOSES:

1. Intrauterine pregnancy 15 weeks.

2. Cervical incompetence.

 

POSTOPERATIVE DIAGNOSES:

1. Intrauterine pregnancy 15 weeks.

2. Cervical incompetence.

 

OPERATION PERFORMED:

Gomez cerclage using #5 Mersilene band.

 

PRIMARY SURGEON:

Fran Clemons MD

 

ASSISTANT:

OR tech.

 

ANESTHESIA:

Spinal.

 

ESTIMATED BLOOD LOSS:

Less than 50 mL.

 

COMPLICATIONS:

None.

 

INDICATIONS FOR SURGERY:

Refer to the admit note.

 

PROCEDURE IN DETAIL:

The patient was brought to the OR, properly identified, and after adequate level

of spinal anesthesia, the patient was placed in lithotomy position, prepped and

draped in sterile fashion as usual.  Straight catheter was used to empty the

bladder, and weighted speculum was placed in the vagina.  A ring forceps was

applied to the upper and lower cervical leaves, and using a #5 Mersilene band,

Gomez cerclage was performed in the usual manner without any problem and tied

with due amount of tension to close the cervix without strangling.  Once this

was accomplished, inspection of the operative field shows no oozing, no

bleeding, and then the procedure ended.  Instrument and sponge counts were

correct.  The patient tolerated the procedure well, went to recovery room in

stable general condition.

 

 

LILLIAN / JYOTI

DD:  08/05/2021 11:00:26

DT:  08/05/2021 14:51:13

Job #:  [id number]